# Patient Record
Sex: FEMALE | Race: WHITE | NOT HISPANIC OR LATINO | Employment: FULL TIME | URBAN - METROPOLITAN AREA
[De-identification: names, ages, dates, MRNs, and addresses within clinical notes are randomized per-mention and may not be internally consistent; named-entity substitution may affect disease eponyms.]

---

## 2017-09-29 ENCOUNTER — ALLSCRIPTS OFFICE VISIT (OUTPATIENT)
Dept: OTHER | Facility: OTHER | Age: 57
End: 2017-09-29

## 2017-09-29 DIAGNOSIS — Z12.31 ENCOUNTER FOR SCREENING MAMMOGRAM FOR MALIGNANT NEOPLASM OF BREAST: ICD-10-CM

## 2017-09-30 ENCOUNTER — LAB CONVERSION - ENCOUNTER (OUTPATIENT)
Dept: OTHER | Facility: OTHER | Age: 57
End: 2017-09-30

## 2017-09-30 LAB
A/G RATIO (HISTORICAL): 1.3 (CALC) (ref 1–2.5)
ALBUMIN SERPL BCP-MCNC: 4.2 G/DL (ref 3.6–5.1)
ALP SERPL-CCNC: 106 U/L (ref 33–130)
ALT SERPL W P-5'-P-CCNC: 13 U/L (ref 6–29)
AST SERPL W P-5'-P-CCNC: 15 U/L (ref 10–35)
BASOPHILS # BLD AUTO: 1.2 %
BASOPHILS # BLD AUTO: 73 CELLS/UL (ref 0–200)
BILIRUB SERPL-MCNC: 0.5 MG/DL (ref 0.2–1.2)
BUN SERPL-MCNC: 15 MG/DL (ref 7–25)
BUN/CREA RATIO (HISTORICAL): NORMAL (CALC) (ref 6–22)
CALCIUM SERPL-MCNC: 9.5 MG/DL (ref 8.6–10.4)
CHLORIDE SERPL-SCNC: 104 MMOL/L (ref 98–110)
CHOLEST SERPL-MCNC: 191 MG/DL
CHOLEST/HDLC SERPL: 3.3 (CALC)
CO2 SERPL-SCNC: 29 MMOL/L (ref 20–31)
CREAT SERPL-MCNC: 0.81 MG/DL (ref 0.5–1.05)
DEPRECATED RDW RBC AUTO: 14.6 % (ref 11–15)
EGFR AFRICAN AMERICAN (HISTORICAL): 93 ML/MIN/1.73M2
EGFR-AMERICAN CALC (HISTORICAL): 81 ML/MIN/1.73M2
EOSINOPHIL # BLD AUTO: 122 CELLS/UL (ref 15–500)
EOSINOPHIL # BLD AUTO: 2 %
GAMMA GLOBULIN (HISTORICAL): 3.2 G/DL (CALC) (ref 1.9–3.7)
GLUCOSE (HISTORICAL): 91 MG/DL (ref 65–99)
HCT VFR BLD AUTO: 34.5 % (ref 35–45)
HDLC SERPL-MCNC: 58 MG/DL
HGB BLD-MCNC: 11.1 G/DL (ref 11.7–15.5)
LDL CHOLESTEROL (HISTORICAL): 105 MG/DL (CALC)
LYMPHOCYTES # BLD AUTO: 2105 CELLS/UL (ref 850–3900)
LYMPHOCYTES # BLD AUTO: 34.5 %
MCH RBC QN AUTO: 27.3 PG (ref 27–33)
MCHC RBC AUTO-ENTMCNC: 32.2 G/DL (ref 32–36)
MCV RBC AUTO: 84.8 FL (ref 80–100)
MONOCYTES # BLD AUTO: 555 CELLS/UL (ref 200–950)
MONOCYTES (HISTORICAL): 9.1 %
NEUTROPHILS # BLD AUTO: 3245 CELLS/UL (ref 1500–7800)
NEUTROPHILS # BLD AUTO: 53.2 %
NON-HDL-CHOL (CHOL-HDL) (HISTORICAL): 133 MG/DL (CALC)
PLATELET # BLD AUTO: 317 THOUSAND/UL (ref 140–400)
PMV BLD AUTO: 11.3 FL (ref 7.5–12.5)
POTASSIUM SERPL-SCNC: 4.7 MMOL/L (ref 3.5–5.3)
RBC # BLD AUTO: 4.07 MILLION/UL (ref 3.8–5.1)
SODIUM SERPL-SCNC: 140 MMOL/L (ref 135–146)
TOTAL PROTEIN (HISTORICAL): 7.4 G/DL (ref 6.1–8.1)
TRIGL SERPL-MCNC: 157 MG/DL
TSH SERPL DL<=0.05 MIU/L-ACNC: 4.16 MIU/L (ref 0.4–4.5)
WBC # BLD AUTO: 6.1 THOUSAND/UL (ref 3.8–10.8)

## 2017-10-02 ENCOUNTER — GENERIC CONVERSION - ENCOUNTER (OUTPATIENT)
Dept: OTHER | Facility: OTHER | Age: 57
End: 2017-10-02

## 2017-10-11 ENCOUNTER — GENERIC CONVERSION - ENCOUNTER (OUTPATIENT)
Dept: OTHER | Facility: OTHER | Age: 57
End: 2017-10-11

## 2017-10-20 ENCOUNTER — HOSPITAL ENCOUNTER (OUTPATIENT)
Dept: RADIOLOGY | Facility: HOSPITAL | Age: 57
Discharge: HOME | End: 2017-10-20
Payer: COMMERCIAL

## 2017-10-20 DIAGNOSIS — Z12.31 ENCOUNTER FOR SCREENING MAMMOGRAM FOR MALIGNANT NEOPLASM OF BREAST: ICD-10-CM

## 2017-10-20 PROCEDURE — G0202 SCR MAMMO BI INCL CAD: HCPCS

## 2017-11-28 ENCOUNTER — GENERIC CONVERSION - ENCOUNTER (OUTPATIENT)
Dept: OTHER | Facility: OTHER | Age: 57
End: 2017-11-28

## 2018-01-10 NOTE — RESULT NOTES
Discussion/Summary   Zari Liz,   Your mammogram is normal    Dr Lynn Breeding [id number] 03:07PM Jess Link Order Number: [id number]    - Patient Instructions: To schedule this appointment, please contact Central Scheduling at [phone]  Do not wear any perfume, powder, lotion or deodorant on breast or underarm area  Please bring your doctors order, referral (if needed) and insurance information with you on the day of the test  Failure to bring this information may result in this test being rescheduled  Arrive 15 minutes prior to your appointment time to register  On the day of your test, please bring any prior mammogram or breast studies with you that were not performed at a [facility]  Failure to bring prior exams may result in your test needing to be rescheduled  Test Name Result Flag Reference   MAMMO SCREENING BILATERAL W CAD (Report)     Patient History:   Patient is postmenopausal    Family history of unknown cancer at age 76 in paternal    grandmother, ovarian cancer at age 70 in paternal aunt, unknown    cancer in paternal uncle, ovaries removed from paternal aunt  Took hormonal contraceptives beginning at age 24  Patient has never smoked  Patient's BMI is 27 4  Reason for exam: screening, asymptomatic  Mammo Screening Bilateral W CAD: October 20, 2017 - Check In #:    [de-identified]   Bilateral CC and MLO view(s) were taken  Technologist: Elnita Gosselin, RTRM   Prior study comparison: July 16, 2016, mammo screening bilateral    W CAD performed at [facility]  May 13,    2015, bilateral screening mammogram performed at [facility]  January 31, 2014, bilateral screening    mammogram performed at [facility]  March 27, 2012, bilateral screening mammogram performed at [facility]   December 27, 2010, bilateral screening mammogram performed at [facility]  There are scattered fibroglandular densities  No dominant soft tissue mass, architectural distortion or    suspicious calcifications are noted in either breast  The skin    and nipple contours are within normal limits  No evidence of malignancy  No significant changes when compared with prior studies  ACR BI-RADSï¾® Assessments: BiRad:1 - Negative     Recommendation:   Routine screening mammogram of both breasts in 1 year  Analyzed by CAD     The patient is scheduled in a reminder system for screening    mammography  8-10% of cancers will be missed on mammography  Management of a    palpable abnormality must be based on clinical grounds  Patients   will be notified of their results via letter from our facility  Accredited by [employer] and FDA  Transcription Location: [facility] 98: [id number]     Risk Value(s):   Tyrer-Cuzick 10 Year: 4 000%, Tyrer-Cuzick Lifetime: 11 500%,    Myriad Table: 3 0%, SANCHEZ 5 Year: 1 4%, NCI Lifetime: 8 7%   Signed by:   Mat Chi MD   10/20/17       Plan  Encounter for screening mammogram for malignant neoplasm of breast    · * MAMMO SCREENING BILATERAL W CAD ; every 2 years;  Last [id number]; Next  06RYL3162; Status:Active

## 2018-01-11 NOTE — RESULT NOTES
Discussion/Summary   [facility],   Here is a copy of the labs we discussed  Dr Vani Reza      Verified Results  (1) COMPREHENSIVE METABOLIC PANEL 57SCS1980 28:58JD Alma Castano     Test Name Result Flag Reference   GLUCOSE 91 mg/dL  65-99   Fasting reference interval   UREA NITROGEN (BUN) 15 mg/dL  7-25   CREATININE 0 81 mg/dL  0 50-1 05   For patients >52years of age, the reference limit  for Creatinine is approximately 13% higher for people  identified as -American  eGFR NON-AFR   AMERICAN 81 mL/min/1 73m2  > OR = 60   eGFR AFRICAN AMERICAN 93 mL/min/1 73m2  > OR = 60   BUN/CREATININE RATIO   1-58   NOT APPLICABLE (calc)   SODIUM 140 mmol/L  135-146   POTASSIUM 4 7 mmol/L  3 5-5 3   CHLORIDE 104 mmol/L     CARBON DIOXIDE 29 mmol/L  20-31   CALCIUM 9 5 mg/dL  8 6-10 4   PROTEIN, TOTAL 7 4 g/dL  6 1-8 1   ALBUMIN 4 2 g/dL  3 6-5 1   GLOBULIN 3 2 g/dL (calc)  1 9-3 7   ALBUMIN/GLOBULIN RATIO 1 3 (calc)  1 0-2 5   BILIRUBIN, TOTAL 0 5 mg/dL  0 2-1 2   ALKALINE PHOSPHATASE 106 U/L     AST 15 U/L  10-35   ALT 13 U/L  6-29     (1) CBC/PLT/DIFF 19Jel8043 10:09AM Alma Castano     Test Name Result Flag Reference   WHITE BLOOD CELL COUNT 6 1 Thousand/uL  3 8-10 8   RED BLOOD CELL COUNT 4 07 Million/uL  3 80-5 10   HEMOGLOBIN 11 1 g/dL L 11 7-15 5   HEMATOCRIT 34 5 % L 35 0-45 0   MCV 84 8 fL  80 0-100 0   MCH 27 3 pg  27 0-33 0   MCHC 32 2 g/dL  32 0-36 0   RDW 14 6 %  11 0-15 0   PLATELET COUNT 572 Thousand/uL  140-400   ABSOLUTE NEUTROPHILS 3245 cells/uL  4134-0242   ABSOLUTE LYMPHOCYTES 2105 cells/uL  850-3900   ABSOLUTE MONOCYTES 555 cells/uL  200-950   ABSOLUTE EOSINOPHILS 122 cells/uL     ABSOLUTE BASOPHILS 73 cells/uL  0-200   NEUTROPHILS 53 2 %     LYMPHOCYTES 34 5 %     MONOCYTES 9 1 %     EOSINOPHILS 2 0 %     BASOPHILS 1 2 %     MPV 11 3 fL  7 5-12 5     (Q) LIPID PANEL WITH REFLEX TO DIRECT LDL 81HKL2242 10:09AM Alma Castano     Test Name Result Flag Reference   CHOLESTEROL, TOTAL 191 mg/dL  <200   HDL CHOLESTEROL 58 mg/dL  >68   TRIGLICERIDES 373 mg/dL H <150   LDL-CHOLESTEROL 105 mg/dL (calc) H    Reference range: <100     Desirable range <100 mg/dL for patients with CHD or  diabetes and <70 mg/dL for diabetic patients with  known heart disease  LDL-C is now calculated using the Bijan-Beauchamp   calculation, which is a validated novel method providing   better accuracy than the Friedewald equation in the   estimation of LDL-C  Vashti Martinez  [facility]  3368;859(88): 6710-1476   (http://FClub/faq/PYQ417)   CHOL/HDLC RATIO 3 3 (calc)  <5 0   NON HDL CHOLESTEROL 133 mg/dL (calc) H <130   For patients with diabetes plus 1 major ASCVD risk   factor, treating to a non-HDL-C goal of <100 mg/dL   (LDL-C of <70 mg/dL) is considered a therapeutic   option       (Q) TSH, 3RD GENERATION 57Zgw1778 10:09AM Filippo Mast   REPORT COMMENT:  FASTING:YES     Test Name Result Flag Reference   TSH 4 16 mIU/L  0 40-4 50

## 2018-01-12 NOTE — RESULT NOTES
Verified Results  * XR SPINE LUMBAR MINIMUM 4 VIEWS 21Apr2016 11:42AM Mariella Jack Order Number: [id number]     Test Name Result Flag Reference   XR SPINE LUMBAR MINIMUM 4 VIEWS (Report)     LUMBAR SPINE     INDICATION: Lower back pain with spasms  COMPARISON: None     VIEWS: AP, lateral, bilateral oblique and coned down projections; 4 images     FINDINGS:     L4 anterolisthesis  There is no radiographic evidence of acute fracture or destructive osseous lesion  Degenerative disc disease at multiple levels most severe at L4-L5 and L5-S1 with associated marginal osteophytosis and facet joint hypertrophy  Visualized soft tissues appear unremarkable  IMPRESSION:     Grade 1 L4 anterolisthesis  Degenerative disc disease at multiple levels most severe at L4-L5 and L5-S1 with associated marginal osteophytosis and facet joint hypertrophy  Workstation performed: [id number]     Signed by:    Luisa Murcia MD   4/21/16       Discussion/Summary   Your xray shows arthritis  - Dr Colton Perez

## 2018-01-16 NOTE — PROGRESS NOTES
Assessment    1  Encounter for preventive health examination (V70 0) (Z00 00)   2  Encounter for screening mammogram for malignant neoplasm of breast (V76 12)   (Z12 31)   3  Screening for cardiovascular condition (V81 2) (Z13 6)   4  Never a smoker   5  Overweight (278 02) (E66 3)    Plan  Benign essential hypertension    · (1) CBC/PLT/DIFF; Status:Active; Requested [id number];    · (1) TSH; Status:Active; Requested [id number];   Encounter for screening mammogram for malignant neoplasm of breast    · * MAMMO SCREENING BILATERAL W CAD; Status:Active; Requested UVD:93PJX1235; Overweight    · Begin a limited exercise program ; Status:Complete;   Done: 91GIP1325   · We recommend that you bring your body mass index down to 26 ; Status:Complete;    Done: 56SSK2445  Screening for cardiovascular condition    · (1) COMPREHENSIVE METABOLIC PANEL; Status:Active; Requested for:10Zgu3678;    · (1) LIPID PANEL FASTING W DIRECT LDL REFLEX; Status:Active; Requested  [id number];     Discussion/Summary  health maintenance visit Currently, she eats a healthy diet and has an adequate exercise regimen  cervical cancer screening is current normal pap smear and HPV negative  Breast cancer screening: mammogram has been ordered  Colorectal cancer screening: colorectal cancer screening is current and the next colonoscopy is due   Chief Complaint  CPE rmklpn      History of Present Illness  HM, Adult Female: The patient is being seen for a health maintenance evaluation  General Health: The patient's health since the last visit is described as good  Lifestyle:  She exercises regularly  She does not use tobacco    Reproductive health: the patient is postmenopausal    Screening:      Review of Systems    Constitutional: No fever, no chills, feels well, no tiredness, no recent weight gain or weight loss     Respiratory: No complaints of shortness of breath, no wheezing, no cough, no SOB on exertion, no orthopnea, no PND    Musculoskeletal: knee pain at night  Active Problems    1  Allergic reaction (995 3) (T78 40XA)   2  Allergic rhinitis (477 9) (J30 9)   3  Back pain (724 5) (M54 9)   4  Baker's cyst, ruptured (727 51) (M66 0)   5  Benign essential hypertension (401 1) (I10)   6  BMI 28 0-28 9,adult (V85 24) (Z68 28)   7  Encounter for routine pelvic examination (V72 31) (Z01 419)   8  Encounter for screening for malignant neoplasm of colon (V76 51) (Z12 11)   9  Encounter for screening mammogram for malignant neoplasm of breast (V76 12)   (Z12 31)   10  GERD without esophagitis (530 81) (K21 9)   11  Long term use of drug (V58 69) (Z79 899)   12  Never a smoker   13  Screening for cardiovascular condition (V81 2) (Z13 6)    Past Medical History    · History of Abdominal pain (789 00) (R10 9)   · Acute maxillary sinusitis (461 0) (J01 00)   · History of Acute upper respiratory infection (465 9) (J06 9)   · History of Acute upper respiratory infection (465 9) (J06 9)   · Baker's cyst, ruptured (727 51) (M66 0)   · History of Closed Fracture Of The Metatarsal Bone(S) (825 25)   · Encounter for routine pelvic examination (V72 31) (Z01 419)   · History of Exposure to rabies (V01 5) (Z20 3)   · History of acute sinusitis (V12 69) (Z87 09)   · History of precordial chest pain (V13 89) (D80 190)   · History of Impacted cerumen of left ear (380 4) (H61 22)   · History of Splinter Of Fingers (915 6)    Family History  Father    · Family history of Glaucoma   · Family history of Hypertension  Paternal Aunt    · Family history of Colon cancer  Family History    · Family history of hypertension (V17 49) (Z82 49)    Social History    · Never a smoker    Current Meds   1  EpiPen 2-Thor 0 3 MG/0 3ML Injection Solution Auto-injector; use as directed for   cephalexin allergy, proceed to ER after use; Therapy: 97FLF4065 to (Last Rx:98Mog3109)  Requested for: 86Bew5796 Ordered   2   Glucosamine-Chondroitin Oral Capsule; 1 every day; Therapy: (Recorded:10Nov2014) to Recorded   3  Losartan Potassium 25 MG Oral Tablet; TAKE 1 TABLET DAILY AS     DIRECTED; Therapy: 40CXQ8621 to (Evaluate:11Oct2017)  Requested for: 58APN3499; Last   Rx:26Bvl7001 Ordered   4  Lysine HCl - 500 MG Oral Tablet; TAKE 1 TABLET DAILY; Therapy: 85BXJ2379 to Recorded   5  NexIUM 24HR 20 MG Oral Capsule Delayed Release; DAILY AS NEEDED; Therapy: 85ZID4808 to (Evaluate:12Jun2015); Last Rx:01Gpv3735 Ordered   6  Vitamin D3 1000 UNIT Oral Tablet; 1 po daily; Therapy: 67RHO5230 to Recorded    Allergies    1  Cephalexin TABS    Vitals   Recorded: 05WBW1145 09:22AM   Temperature 97 2 F   Heart Rate 74   Respiration 18   Systolic 933   Diastolic 80   Height 5 ft 6 in   Weight 180 lb    BMI Calculated 29 05   BSA Calculated 1 91     Physical Exam    Constitutional   General appearance: No acute distress, well appearing and well nourished  Ears, Nose, Mouth, and Throat   External inspection of ears and nose: Normal     Otoscopic examination: Tympanic membranes translucent with normal light reflex  Canals patent without erythema  Oropharynx: Normal with no erythema, edema, exudate or lesions  Pulmonary   Auscultation of lungs: Clear to auscultation  Cardiovascular   Auscultation of heart: Normal rate and rhythm, normal S1 and S2, no murmurs  Abdomen   Abdomen: Non-tender, no masses  Musculoskeletal   Gait and station: Normal        Results/Data  PHQ-2 Adult Depression Screening 05Syu2160 09:22AM User, s     Test Name Result Flag Reference   PHQ-2 Adult Depression Score 0     Over the last two weeks, how often have you been bothered by any of the following problems? Little interest or pleasure in doing things: Not at all - 0  Feeling down, depressed, or hopeless: Not at all - 0   PHQ-2 Adult Depression Screening Negative         Procedure    Procedure: Visual Acuity Test    Indication: routine screening  Inforrmation supplied by a Snellen chart     Results: 20/25 in both eyes without corrective device, 20/25 in the right eye without corrective device, 20/25 in the left eye without corrective device      Health Management  Encounter for screening mammogram for malignant neoplasm of breast   * MAMMO SCREENING BILATERAL W CAD; every 2 years; Last 80WQQ1037; Next Due:  45UCE3025;  Active    Signatures   Electronically signed by : Jeferson Chacon DO; Sep 29 2017 11:17AM EST                       (Author)

## 2018-01-16 NOTE — RESULT NOTES
Verified Results  * MAMMO SCREENING BILATERAL W CAD 63VWS0064 10:03AM Sharon Jolley Order Number: [id number]     Test Name Result Flag Reference   MAMMO SCREENING BILATERAL W CAD (Report)     Patient History:   Patient is postmenopausal    Family history of unknown cancer in paternal uncle, ovarian    cancer in paternal aunt at age 70, unknown cancer in paternal    grandmother at age 76, and ovaries removed from paternal aunt  Took hormonal contraceptives beginning at age 24  Patient has never smoked  Patient's BMI is 27 4  Reason for exam: screening (asymptomatic)  Screening     Mammo Screening Bilateral W CAD: July 16, 2016 - Check In #:    [de-identified]   Bilateral CC and MLO view(s) were taken  Technologist: RT Isiah(R)(M)   Prior study comparison: May 13, 2015, bilateral screening    mammogram performed at [place] [address]  January 31, 2014, bilateral screening mammogram performed at [place] [address]  March 27, 2012, bilateral    screening mammogram performed at [place] [address]  December 27, 2010, bilateral screening mammogram    performed at [place] [address]  There are scattered fibroglandular densities  No dominate soft    tissue mass, architectural distortion or suspicious    calcifications are noted  The skin and nipple contours are    within normal limits  No evidence of malignancy  No significant   change when compared to the prior studies  1  No evidence of malignancy  2  No significant change when compared with the prior study  ASSESSMENT: BiRad:1 - Negative     Recommendation:   Routine screening mammogram of both breasts in 1 year  A reminder letter will be scheduled   Analyzed by CAD     8-10% of cancers will be missed on mammography  Management of a    palpable abnormality must be based on clinical grounds   Patients   will be notified of their results via letter from our facility  Accredited by [employer] and FDA  Transcription Location: [facility] 98: [id number]     Risk Value(s):   Tyrer-Cuzick 10 Year: 3 757%, Tyrer-Cuzick Lifetime: 12 264%,    Myriad Table: 3 0%, SANCHEZ 5 Year: 1 3%, NCI Lifetime: 9 1%   Signed by:   Donavan Shaikh MD   7/18/16       Plan  Benign essential hypertension    · Follow-up visit in 6 months Evaluation and Treatment  Follow-up  Status: Complete -  Scheduling  Done: 39EZN9402 07:17PM  Benign essential hypertension, Screening for cardiovascular condition, Screening for  deficiency anemia, Screening for diabetes mellitus    · (1) CBC/PLT/DIFF; Status:Active; Requested for:51Izh1743;    · (1) COMPREHENSIVE METABOLIC PANEL; Status:Active; Requested for:84Jvo9924;    · (1) LIPID PANEL FASTING W DIRECT LDL REFLEX; Status:Active; Requested  for:08Jnv0572;    · (1) TSH; Status:Active; Requested for:03Ljg9300;   Long term use of drug    · (1) MAGNESIUM; Status:Active; Requested for:80Zak4157;    · (1) VITAMIN B12; Status:Active;  Requested for:48Hig8253;   PMH: Bug bite    · Triamcinolone Acetonide 0 1 % External Cream    Discussion/Summary   Rufina,   Your mammogram is normal    Dr Ulisses Guillory

## 2018-01-17 NOTE — RESULT NOTES
Verified Results  (1) COMPREHENSIVE METABOLIC PANEL 61LFG8999 82:87GD Jaimie [place]     Test Name Result Flag Reference   GLUCOSE 92 mg/dL  65-99   Fasting reference interval   UREA NITROGEN (BUN) 18 mg/dL  7-25   CREATININE 0 79 mg/dL  0 50-1 05   For patients >52years of age, the reference limit  for Creatinine is approximately 13% higher for people  identified as -American  eGFR NON-AFR   AMERICAN 84 mL/min/1 73m2  > OR = 60   eGFR AFRICAN AMERICAN 98 mL/min/1 73m2  > OR = 60   BUN/CREATININE RATIO   4-01   NOT APPLICABLE (calc)   SODIUM 138 mmol/L  135-146   POTASSIUM 4 3 mmol/L  3 5-5 3   CHLORIDE 102 mmol/L     CARBON DIOXIDE 26 mmol/L  19-30   CALCIUM 9 2 mg/dL  8 6-10 4   PROTEIN, TOTAL 7 1 g/dL  6 1-8 1   ALBUMIN 4 0 g/dL  3 6-5 1   GLOBULIN 3 1 g/dL (calc)  1 9-3 7   ALBUMIN/GLOBULIN RATIO 1 3 (calc)  1 0-2 5   BILIRUBIN, TOTAL 0 4 mg/dL  0 2-1 2   ALKALINE PHOSPHATASE 106 U/L     AST 14 U/L  10-35   ALT 12 U/L  6-29     (1) CBC/PLT/DIFF 80LEV0557 10:10AM Jaimie [place]     Test Name Result Flag Reference   WHITE BLOOD CELL COUNT 5 6 Thousand/uL  3 8-10 8   RED BLOOD CELL COUNT 4 06 Million/uL  3 80-5 10   HEMOGLOBIN 12 3 g/dL  11 7-15 5   HEMATOCRIT 37 2 %  35 0-45 0   MCV 91 7 fL  80 0-100 0   MCH 30 3 pg  27 0-33 0   MCHC 33 1 g/dL  32 0-36 0   RDW 13 7 %  11 0-15 0   PLATELET COUNT 227 Thousand/uL  140-400   MPV 9 6 fL  7 5-11 5   ABSOLUTE NEUTROPHILS 2694 cells/uL  0022-9488   ABSOLUTE LYMPHOCYTES 2201 cells/uL  850-3900   ABSOLUTE MONOCYTES 510 cells/uL  200-950   ABSOLUTE EOSINOPHILS 146 cells/uL     ABSOLUTE BASOPHILS 50 cells/uL  0-200   NEUTROPHILS 48 1 %     LYMPHOCYTES 39 3 %     MONOCYTES 9 1 %     EOSINOPHILS 2 6 %     BASOPHILS 0 9 %       (1) MAGNESIUM 06Ebg1750 10:10AM Jaimie [place]     Test Name Result Flag Reference   MAGNESIUM 1 9 mg/dL  1 5-2 5     (1) VITAMIN B12 07OBN8590 10:10AM Jaimie Mcintyre     Test Name Result Flag Reference   VITAMIN B12 512 pg/mL 200-1100     (Q) LIPID PANEL WITH REFLEX TO DIRECT LDL 98VJD5623 10:10AM [employer]     Test Name Result Flag Reference   CHOLESTEROL, TOTAL 209 mg/dL H 125-200   HDL CHOLESTEROL 59 mg/dL  > OR = 46   TRIGLICERIDES 899 mg/dL H <150   LDL-CHOLESTEROL 116 mg/dL (calc)  <130   Desirable range <100 mg/dL for patients with CHD or  diabetes and <70 mg/dL for diabetic patients with  known heart disease  CHOL/HDLC RATIO 3 5 (calc)  < OR = 5 0   NON HDL CHOLESTEROL 150 mg/dL (calc)     Target for non-HDL cholesterol is 30 mg/dL higher than   LDL cholesterol target  (Q) TSH, 3RD GENERATION 20IVR8596 10:10AM [employer]     Test Name Result Flag Reference   TSH 3 11 mIU/L     Reference Range                         > or = 20 Years  0 40-4 50                              Pregnancy Ranges            First trimester    0 26-2 66            Second trimester   0 55-2 73            Third trimester    0 43-2 91       Discussion/Summary   Gonzales Miller,   Your triglycerides have increased  Please watch your sugar/carbohydrate intake     Otherwise, kidney function, liver function, blood count, Thyroid, Vitamin B12, Magnesium and blood sugar are normal    Dr Hayder Lechuga

## 2018-01-22 VITALS
WEIGHT: 180 LBS | RESPIRATION RATE: 18 BRPM | BODY MASS INDEX: 28.93 KG/M2 | DIASTOLIC BLOOD PRESSURE: 80 MMHG | HEIGHT: 66 IN | TEMPERATURE: 97.2 F | HEART RATE: 74 BPM | SYSTOLIC BLOOD PRESSURE: 120 MMHG

## 2018-02-28 ENCOUNTER — TELEPHONE (OUTPATIENT)
Dept: FAMILY MEDICINE CLINIC | Facility: CLINIC | Age: 58
End: 2018-02-28

## 2018-02-28 DIAGNOSIS — Z77.21 EXPOSURE TO BLOOD OR BODY FLUID: Primary | ICD-10-CM

## 2018-03-01 NOTE — TELEPHONE ENCOUNTER
Pt  States  She will be going to [facility]  3/18/2018     Was told   Its recommended to get a  Heptatis A vaccine  Pt  Would also like to know  If she is immune to measles, mumps and rubella  af/rma     Please advise if pt could receive  Vaccine   Af/rma

## 2018-03-02 NOTE — TELEPHONE ENCOUNTER
3/2/2018 7:27 AM She should schedule a Hepatitis A vaccine  I don't know if she is immune to measles, mumps and rubella  If she would like blood work to find out I can order it for her    Caryn Hansen, DO

## 2018-03-31 DIAGNOSIS — I10 BENIGN ESSENTIAL HYPERTENSION: Primary | ICD-10-CM

## 2018-04-01 PROBLEM — E66.3 OVERWEIGHT: Status: ACTIVE | Noted: 2017-09-29

## 2018-04-01 RX ORDER — LOSARTAN POTASSIUM 25 MG/1
TABLET ORAL
Qty: 90 TABLET | Refills: 1 | Status: SHIPPED | OUTPATIENT
Start: 2018-04-01 | End: 2018-09-06 | Stop reason: SDUPTHER

## 2018-09-06 DIAGNOSIS — I10 BENIGN ESSENTIAL HYPERTENSION: ICD-10-CM

## 2018-09-06 NOTE — TELEPHONE ENCOUNTER
Please call the patient  She has not been seen in almost a year  I would like her to set up an appointment and she needs lab work as well  When she schedules  her appointment I will refill her losartan    Toñito Frances, DO

## 2018-09-07 RX ORDER — LOSARTAN POTASSIUM 25 MG/1
25 TABLET ORAL DAILY
Qty: 90 TABLET | Refills: 0 | Status: SHIPPED | OUTPATIENT
Start: 2018-09-07 | End: 2018-09-10 | Stop reason: SDUPTHER

## 2018-09-07 NOTE — TELEPHONE ENCOUNTER
9/7/2018 8:05 AM I refilled her medication  I also wrote up a slip for her lab work and mailed to her  No further action required at this time    Dr Cori Silva

## 2018-09-10 DIAGNOSIS — I10 BENIGN ESSENTIAL HYPERTENSION: ICD-10-CM

## 2018-09-10 RX ORDER — LOSARTAN POTASSIUM 25 MG/1
25 TABLET ORAL DAILY
Qty: 30 TABLET | Refills: 0 | Status: SHIPPED | OUTPATIENT
Start: 2018-09-10 | End: 2018-10-08 | Stop reason: SDUPTHER

## 2018-09-10 NOTE — TELEPHONE ENCOUNTER
Please refill losartan to [facility]  An order went to mail order but can she have a short term to [facility]

## 2018-10-06 RX ORDER — EPINEPHRINE 0.3 MG/.3ML
INJECTION SUBCUTANEOUS
COMMUNITY
Start: 2015-07-24

## 2018-10-08 ENCOUNTER — OFFICE VISIT (OUTPATIENT)
Dept: FAMILY MEDICINE CLINIC | Facility: CLINIC | Age: 58
End: 2018-10-08
Payer: COMMERCIAL

## 2018-10-08 VITALS
WEIGHT: 171 LBS | RESPIRATION RATE: 16 BRPM | DIASTOLIC BLOOD PRESSURE: 80 MMHG | TEMPERATURE: 96.9 F | SYSTOLIC BLOOD PRESSURE: 116 MMHG | BODY MASS INDEX: 27.48 KG/M2 | HEART RATE: 74 BPM | HEIGHT: 66 IN

## 2018-10-08 DIAGNOSIS — Z00.00 ANNUAL PHYSICAL EXAM: Primary | ICD-10-CM

## 2018-10-08 DIAGNOSIS — Z12.31 SCREENING MAMMOGRAM, ENCOUNTER FOR: ICD-10-CM

## 2018-10-08 DIAGNOSIS — Z23 NEED FOR VACCINATION: ICD-10-CM

## 2018-10-08 DIAGNOSIS — I10 BENIGN ESSENTIAL HYPERTENSION: ICD-10-CM

## 2018-10-08 DIAGNOSIS — Z12.11 COLON CANCER SCREENING: ICD-10-CM

## 2018-10-08 DIAGNOSIS — Z01.419 ENCOUNTER FOR GYNECOLOGICAL EXAMINATION WITHOUT ABNORMAL FINDING: ICD-10-CM

## 2018-10-08 DIAGNOSIS — D17.1 LIPOMA OF BACK: ICD-10-CM

## 2018-10-08 LAB — SL AMB POCT FECES OCC BLD: NEGATIVE

## 2018-10-08 PROCEDURE — 90471 IMMUNIZATION ADMIN: CPT

## 2018-10-08 PROCEDURE — 90715 TDAP VACCINE 7 YRS/> IM: CPT

## 2018-10-08 PROCEDURE — 99396 PREV VISIT EST AGE 40-64: CPT | Performed by: FAMILY MEDICINE

## 2018-10-08 PROCEDURE — 82270 OCCULT BLOOD FECES: CPT | Performed by: FAMILY MEDICINE

## 2018-10-08 RX ORDER — LOSARTAN POTASSIUM 25 MG/1
25 TABLET ORAL DAILY
Qty: 90 TABLET | Refills: 1 | Status: SHIPPED | OUTPATIENT
Start: 2018-10-08 | End: 2019-06-14 | Stop reason: SDUPTHER

## 2018-10-08 NOTE — PROGRESS NOTES
FAMILY PRACTICE HEALTH MAINTENANCE OFFICE VISIT  [facility]    NAME: Pedro Rand  AGE: 62 y o  SEX: female  : 1960     DATE: 10/8/2018    Assessment and Plan     Problem List Items Addressed This Visit     Benign essential hypertension    Relevant Medications    EPINEPHrine (EPIPEN) 0 3 mg/0 3 mL SOAJ    losartan (COZAAR) 25 mg tablet    Other Relevant Orders    CBC    Comprehensive metabolic panel    Lipid Panel with Direct LDL reflex    Lipoma of back    Relevant Orders    Ambulatory referral to General Surgery      Other Visit Diagnoses     Annual physical exam    -  Primary    Encounter for gynecological examination without abnormal finding        Relevant Orders    Thinprep Pap and HR HPV DNA    BMI 28 0-28 9,adult        has started weight watchers, exercise encouraged  Screening mammogram, encounter for        Relevant Orders    Mammo screening bilateral w cad    Need for vaccination        Relevant Orders    TDAP VACCINE GREATER THAN OR EQUAL TO 6YO IM (Completed)    Colon cancer screening        Relevant Orders    POCT hemoccult screening (Completed)            · Patient Counseling:   · Nutrition: Stressed importance of a well balanced diet, moderation of sodium/saturated fat, caloric balance and sufficient intake of fiber  · Exercise: Stressed the importance of regular exercise with a goal of 150 minutes per week  · Dental Health: Discussed daily flossing and brushing and regular dental visits     · Immunizations reviewed Shingrix vaccine highly vaccine highly recommended  She is planning on getting her flu shot done at work  · Discussed benefits of screening mammogram and pap smears  · Discussed the patient's BMI with her  The BMI is above average; BMI management plan is completed     Return in about 6 months (around 2019) for Next scheduled follow up          Chief Complaint     Chief Complaint   Patient presents with    Physical Exam With pap if due/ prcma       History of Present Illness     She has started Weight Watchers  Well Adult Physical   Patient here for a comprehensive physical exam       Diet and Physical Activity  Diet: well balanced diet  Weight concerns: Patient is overweight (BMI 25 0-29  9)  Exercise: infrequently      Depression Screen  PHQ-9 Depression Screening    PHQ-9:    Frequency of the following problems over the past two weeks:       Little interest or pleasure in doing things:  0 - not at all  Feeling down, depressed, or hopeless:  0 - not at all  PHQ-2 Score:  0          General Health  Hearing: Normal:  bilateral  Vision: wears glasses  Dental: regular dental visits    Reproductive Health  Post menopause      The following portions of the patient's history were reviewed and updated as appropriate: allergies, current medications, past family history, past medical history, past social history, past surgical history and problem list     Review of Systems     Review of Systems   Respiratory: Negative  Cardiovascular: Negative  Skin:        Lump left side of back       Past Medical History     Past Medical History:   Diagnosis Date    Baker's cyst, ruptured     Last assessed - 11/11/14    Closed fracture of metatarsal bone 01/13/2009    Exposure to rabies 06/19/2008    Precordial chest pain     Last assessed - 5/29/13       Past Surgical History     No past surgical history on file      Social History     Social History     Social History    Marital status:      Spouse name: N/A    Number of children: N/A    Years of education: N/A     Social History Main Topics    Smoking status: Never Smoker    Smokeless tobacco: Never Used    Alcohol use None    Drug use: Unknown    Sexual activity: Not Asked     Other Topics Concern    None     Social History Narrative    None       Family History     Family History   Problem Relation Age of Onset   Dayne Graves Glaucoma Father     Hypertension Father    Dayne Graves Colon cancer Paternal Aunt     Hypertension Family        Current Medications       Current Outpatient Prescriptions:     EPINEPHrine (EPIPEN) 0 3 mg/0 3 mL SOAJ, Inject as directed, Disp: , Rfl:     losartan (COZAAR) 25 mg tablet, Take 1 tablet (25 mg total) by mouth daily, Disp: 90 tablet, Rfl: 1     Allergies     Allergies   Allergen Reactions    Cephalexin        Objective     /80   Pulse 74   Temp (!) 96 9 °F (36 1 °C)   Resp 16   Ht 5' 5 5" (1 664 m)   Wt 77 6 kg (171 lb)   BMI 28 02 kg/m²      Physical Exam   Constitutional: She appears well-developed and well-nourished  HENT:   Head: Normocephalic and atraumatic  Right Ear: External ear normal    Left Ear: External ear normal    Mouth/Throat: Oropharynx is clear and moist    Neck: Normal range of motion  Cardiovascular: Normal rate, regular rhythm and normal heart sounds  No murmur heard  Pulmonary/Chest: Effort normal and breath sounds normal  No respiratory distress  She has no wheezes  She has no rales  Abdominal: Soft  Bowel sounds are normal  She exhibits no distension  There is no tenderness  There is no rebound  Genitourinary: Rectum normal and vagina normal  Rectal exam shows no tenderness and guaiac negative stool  No breast swelling, tenderness, discharge or bleeding  There is no rash, tenderness or lesion on the right labia  There is no rash, tenderness or lesion on the left labia  No tenderness in the vagina  No vaginal discharge found  Genitourinary Comments: Uterus - non-tender  Ovaries - no palpable masses   Skin:   Palpable lump left mid back   Nursing note and vitals reviewed           Visual Acuity Screening    Right eye Left eye Both eyes   Without correction: 20 /30 20/ 25 20 /25   With correction:          Health Maintenance     Health Maintenance   Topic Date Due    Depression Screening PHQ  1960    CRC Screening: Colonoscopy  1960    PAP SMEAR  01/15/2017    INFLUENZA VACCINE  09/01/2018  DTaP,Tdap,and Td Vaccines (2 - Td) 10/29/2018    MAMMOGRAM  10/20/2019     Immunization History   Administered Date(s) Administered    Influenza TIV (IM) 10/20/2010    Rabies Immune Globulin 06/26/2008, 07/03/2008, 07/17/2008    Tdap 10/29/2008, 10/08/2018       Ana Marques, [address] [facility]

## 2018-10-12 LAB
CLINICAL INFO: NORMAL
DATE PREVIOUS BX: NORMAL
HPV I/H RISK 1 DNA CVX QL PROBE+SIG AMP: NOT DETECTED
LMP START DATE: NORMAL
QUESTION/PROBLEM: NORMAL
SL AMB CONTAINER TYPE: NORMAL
SL AMB FINAL RESOLUTION: NORMAL
SL AMB PREV. PAP:: NORMAL
SL AMB REPORT STATUS: NORMAL
SPECIMEN SOURCE CVX/VAG CYTO: NORMAL
STAT OF ADQ CVX/VAG CYTO-IMP: NORMAL

## 2018-10-19 ENCOUNTER — TELEPHONE (OUTPATIENT)
Dept: FAMILY MEDICINE CLINIC | Facility: CLINIC | Age: 58
End: 2018-10-19

## 2018-10-19 DIAGNOSIS — Z79.899 ENCOUNTER FOR LONG-TERM CURRENT USE OF MEDICATION: Primary | ICD-10-CM

## 2018-10-19 NOTE — TELEPHONE ENCOUNTER
Pt called, states that because she takes an antacid all the time prescribed by her gi, she would like magnesium levels added on the blood work you ordered  She went to [facility] in [place]  Please advise if that can be added  Pt would like a call to let her know if it was added     bchurch lpn

## 2018-10-19 NOTE — TELEPHONE ENCOUNTER
Added magnesium level to the blood test on [employer]   No further action is required at this time Brian Kwok MA

## 2018-10-19 NOTE — TELEPHONE ENCOUNTER
I wrote a new order for a magnesium level  I put the order at the nurse desk    Please call [facility] and see if they can add the test and fax them the order      When this is complete please let her know  Sirena Simmons, DO

## 2018-10-23 LAB
ALBUMIN SERPL-MCNC: 4.3 G/DL (ref 3.6–5.1)
ALBUMIN/GLOB SERPL: 1.5 (CALC) (ref 1–2.5)
ALP SERPL-CCNC: 87 U/L (ref 33–130)
ALT SERPL-CCNC: 15 U/L (ref 6–29)
AST SERPL-CCNC: 15 U/L (ref 10–35)
BASOPHILS # BLD AUTO: 62 CELLS/UL (ref 0–200)
BASOPHILS NFR BLD AUTO: 1 %
BILIRUB SERPL-MCNC: 0.5 MG/DL (ref 0.2–1.2)
BUN SERPL-MCNC: 15 MG/DL (ref 7–25)
BUN/CREAT SERPL: NORMAL (CALC) (ref 6–22)
CALCIUM SERPL-MCNC: 9.6 MG/DL (ref 8.6–10.4)
CHLORIDE SERPL-SCNC: 106 MMOL/L (ref 98–110)
CHOLEST SERPL-MCNC: 211 MG/DL
CHOLEST/HDLC SERPL: 3.5 (CALC)
CO2 SERPL-SCNC: 28 MMOL/L (ref 20–32)
CREAT SERPL-MCNC: 0.89 MG/DL (ref 0.5–1.05)
EOSINOPHIL # BLD AUTO: 112 CELLS/UL (ref 15–500)
EOSINOPHIL NFR BLD AUTO: 1.8 %
ERYTHROCYTE [DISTWIDTH] IN BLOOD BY AUTOMATED COUNT: 13.1 % (ref 11–15)
GLOBULIN SER CALC-MCNC: 2.8 G/DL (CALC) (ref 1.9–3.7)
GLUCOSE SERPL-MCNC: 92 MG/DL (ref 65–99)
HCT VFR BLD AUTO: 40.6 % (ref 35–45)
HDLC SERPL-MCNC: 61 MG/DL
HGB BLD-MCNC: 13.3 G/DL (ref 11.7–15.5)
LDLC SERPL CALC-MCNC: 126 MG/DL (CALC)
LYMPHOCYTES # BLD AUTO: 2065 CELLS/UL (ref 850–3900)
LYMPHOCYTES NFR BLD AUTO: 33.3 %
MAGNESIUM SERPL-MCNC: 2.1 MG/DL (ref 1.5–2.5)
MCH RBC QN AUTO: 30.1 PG (ref 27–33)
MCHC RBC AUTO-ENTMCNC: 32.8 G/DL (ref 32–36)
MCV RBC AUTO: 91.9 FL (ref 80–100)
MONOCYTES # BLD AUTO: 471 CELLS/UL (ref 200–950)
MONOCYTES NFR BLD AUTO: 7.6 %
NEUTROPHILS # BLD AUTO: 3491 CELLS/UL (ref 1500–7800)
NEUTROPHILS NFR BLD AUTO: 56.3 %
NONHDLC SERPL-MCNC: 150 MG/DL (CALC)
PLATELET # BLD AUTO: 314 THOUSAND/UL (ref 140–400)
PMV BLD REES-ECKER: 10.9 FL (ref 7.5–12.5)
POTASSIUM SERPL-SCNC: 5.3 MMOL/L (ref 3.5–5.3)
PROT SERPL-MCNC: 7.1 G/DL (ref 6.1–8.1)
RBC # BLD AUTO: 4.42 MILLION/UL (ref 3.8–5.1)
REF LAB TEST NAME: NORMAL
REF LAB TEST: 622
SL AMB CLIENT CONTACT: NORMAL
SL AMB EGFR AFRICAN AMERICAN: 83 ML/MIN/1.73M2
SL AMB EGFR NON AFRICAN AMERICAN: 71 ML/MIN/1.73M2
SODIUM SERPL-SCNC: 142 MMOL/L (ref 135–146)
TRIGL SERPL-MCNC: 127 MG/DL
WBC # BLD AUTO: 6.2 THOUSAND/UL (ref 3.8–10.8)

## 2018-12-05 ENCOUNTER — HOSPITAL ENCOUNTER (OUTPATIENT)
Dept: RADIOLOGY | Facility: HOSPITAL | Age: 58
Discharge: HOME | End: 2018-12-05
Payer: COMMERCIAL

## 2018-12-05 VITALS — HEIGHT: 66 IN | WEIGHT: 160 LBS | BODY MASS INDEX: 25.71 KG/M2

## 2018-12-05 DIAGNOSIS — Z12.31 SCREENING MAMMOGRAM, ENCOUNTER FOR: ICD-10-CM

## 2018-12-05 PROCEDURE — 77067 SCR MAMMO BI INCL CAD: CPT

## 2018-12-05 PROCEDURE — 77063 BREAST TOMOSYNTHESIS BI: CPT

## 2019-04-19 ENCOUNTER — OFFICE VISIT (OUTPATIENT)
Dept: FAMILY MEDICINE CLINIC | Facility: CLINIC | Age: 59
End: 2019-04-19
Payer: COMMERCIAL

## 2019-04-19 VITALS
WEIGHT: 165 LBS | TEMPERATURE: 98.1 F | RESPIRATION RATE: 16 BRPM | HEIGHT: 66 IN | HEART RATE: 68 BPM | DIASTOLIC BLOOD PRESSURE: 80 MMHG | BODY MASS INDEX: 26.52 KG/M2 | SYSTOLIC BLOOD PRESSURE: 130 MMHG

## 2019-04-19 DIAGNOSIS — I10 BENIGN ESSENTIAL HYPERTENSION: Primary | ICD-10-CM

## 2019-04-19 PROBLEM — R92.2 DENSE BREAST: Status: ACTIVE | Noted: 2019-04-19

## 2019-04-19 PROBLEM — R92.30 DENSE BREAST: Status: ACTIVE | Noted: 2019-04-19

## 2019-04-19 PROCEDURE — 99213 OFFICE O/P EST LOW 20 MIN: CPT | Performed by: FAMILY MEDICINE

## 2019-04-19 PROCEDURE — 3075F SYST BP GE 130 - 139MM HG: CPT | Performed by: FAMILY MEDICINE

## 2019-04-19 PROCEDURE — 3079F DIAST BP 80-89 MM HG: CPT | Performed by: FAMILY MEDICINE

## 2019-04-27 LAB
ALBUMIN SERPL-MCNC: 4.3 G/DL (ref 3.5–5.5)
ALBUMIN/GLOB SERPL: 1.7 {RATIO} (ref 1.2–2.2)
ALP SERPL-CCNC: 82 IU/L (ref 39–117)
ALT SERPL-CCNC: 16 IU/L (ref 0–32)
AST SERPL-CCNC: 20 IU/L (ref 0–40)
BASOPHILS # BLD AUTO: 0 X10E3/UL (ref 0–0.2)
BASOPHILS NFR BLD AUTO: 1 %
BILIRUB SERPL-MCNC: 0.3 MG/DL (ref 0–1.2)
BUN SERPL-MCNC: 16 MG/DL (ref 6–24)
BUN/CREAT SERPL: 19 (ref 9–23)
CALCIUM SERPL-MCNC: 9.8 MG/DL (ref 8.7–10.2)
CHLORIDE SERPL-SCNC: 102 MMOL/L (ref 96–106)
CHOLEST SERPL-MCNC: 223 MG/DL (ref 100–199)
CO2 SERPL-SCNC: 25 MMOL/L (ref 20–29)
CREAT SERPL-MCNC: 0.83 MG/DL (ref 0.57–1)
EOSINOPHIL # BLD AUTO: 0.1 X10E3/UL (ref 0–0.4)
EOSINOPHIL NFR BLD AUTO: 2 %
ERYTHROCYTE [DISTWIDTH] IN BLOOD BY AUTOMATED COUNT: 14.3 % (ref 12.3–15.4)
GLOBULIN SER-MCNC: 2.6 G/DL (ref 1.5–4.5)
GLUCOSE SERPL-MCNC: 95 MG/DL (ref 65–99)
HCT VFR BLD AUTO: 38.5 % (ref 34–46.6)
HDLC SERPL-MCNC: 58 MG/DL
HGB BLD-MCNC: 12.7 G/DL (ref 11.1–15.9)
IMM GRANULOCYTES # BLD: 0 X10E3/UL (ref 0–0.1)
IMM GRANULOCYTES NFR BLD: 0 %
LABCORP COMMENT: NORMAL
LDLC SERPL CALC-MCNC: 134 MG/DL (ref 0–99)
LYMPHOCYTES # BLD AUTO: 2.4 X10E3/UL (ref 0.7–3.1)
LYMPHOCYTES NFR BLD AUTO: 42 %
MCH RBC QN AUTO: 30.2 PG (ref 26.6–33)
MCHC RBC AUTO-ENTMCNC: 33 G/DL (ref 31.5–35.7)
MCV RBC AUTO: 91 FL (ref 79–97)
MICRODELETION SYND BLD/T FISH: NORMAL
MONOCYTES # BLD AUTO: 0.6 X10E3/UL (ref 0.1–0.9)
MONOCYTES NFR BLD AUTO: 10 %
NEUTROPHILS # BLD AUTO: 2.5 X10E3/UL (ref 1.4–7)
NEUTROPHILS NFR BLD AUTO: 45 %
PLATELET # BLD AUTO: 328 X10E3/UL (ref 150–379)
POTASSIUM SERPL-SCNC: 4.4 MMOL/L (ref 3.5–5.2)
PROT SERPL-MCNC: 6.9 G/DL (ref 6–8.5)
RBC # BLD AUTO: 4.21 X10E6/UL (ref 3.77–5.28)
SL AMB EGFR AFRICAN AMERICAN: 90 ML/MIN/1.73
SL AMB EGFR NON AFRICAN AMERICAN: 78 ML/MIN/1.73
SODIUM SERPL-SCNC: 142 MMOL/L (ref 134–144)
TRIGL SERPL-MCNC: 153 MG/DL (ref 0–149)
TSH SERPL DL<=0.005 MIU/L-ACNC: 3.12 UIU/ML (ref 0.45–4.5)
WBC # BLD AUTO: 5.6 X10E3/UL (ref 3.4–10.8)

## 2019-06-14 DIAGNOSIS — I10 BENIGN ESSENTIAL HYPERTENSION: ICD-10-CM

## 2019-06-14 RX ORDER — LOSARTAN POTASSIUM 25 MG/1
TABLET ORAL
Qty: 90 TABLET | Refills: 1 | Status: SHIPPED | OUTPATIENT
Start: 2019-06-14 | End: 2019-10-21 | Stop reason: SDUPTHER

## 2019-06-21 ENCOUNTER — OFFICE VISIT (OUTPATIENT)
Dept: URGENT CARE | Facility: CLINIC | Age: 59
End: 2019-06-21
Payer: COMMERCIAL

## 2019-06-21 ENCOUNTER — APPOINTMENT (OUTPATIENT)
Dept: RADIOLOGY | Facility: CLINIC | Age: 59
End: 2019-06-21
Payer: COMMERCIAL

## 2019-06-21 VITALS
SYSTOLIC BLOOD PRESSURE: 110 MMHG | OXYGEN SATURATION: 100 % | DIASTOLIC BLOOD PRESSURE: 78 MMHG | TEMPERATURE: 98.2 F | HEIGHT: 66 IN | BODY MASS INDEX: 26.71 KG/M2 | WEIGHT: 166.2 LBS | HEART RATE: 72 BPM | RESPIRATION RATE: 16 BRPM

## 2019-06-21 DIAGNOSIS — S99.921A FOOT INJURY, RIGHT, INITIAL ENCOUNTER: Primary | ICD-10-CM

## 2019-06-21 DIAGNOSIS — S99.921A FOOT INJURY, RIGHT, INITIAL ENCOUNTER: ICD-10-CM

## 2019-06-21 PROCEDURE — 99213 OFFICE O/P EST LOW 20 MIN: CPT | Performed by: FAMILY MEDICINE

## 2019-06-21 PROCEDURE — 73630 X-RAY EXAM OF FOOT: CPT

## 2019-09-30 ENCOUNTER — OFFICE VISIT (OUTPATIENT)
Dept: FAMILY MEDICINE CLINIC | Facility: CLINIC | Age: 59
End: 2019-09-30
Payer: COMMERCIAL

## 2019-09-30 VITALS
WEIGHT: 164 LBS | BODY MASS INDEX: 26.36 KG/M2 | HEART RATE: 65 BPM | HEIGHT: 66 IN | RESPIRATION RATE: 16 BRPM | DIASTOLIC BLOOD PRESSURE: 90 MMHG | TEMPERATURE: 99.7 F | OXYGEN SATURATION: 99 % | SYSTOLIC BLOOD PRESSURE: 130 MMHG

## 2019-09-30 DIAGNOSIS — J06.9 ACUTE URI: Primary | ICD-10-CM

## 2019-09-30 PROCEDURE — 99213 OFFICE O/P EST LOW 20 MIN: CPT | Performed by: NURSE PRACTITIONER

## 2019-09-30 RX ORDER — AZITHROMYCIN 250 MG/1
TABLET, FILM COATED ORAL
Qty: 6 TABLET | Refills: 0 | Status: SHIPPED | OUTPATIENT
Start: 2019-09-30 | End: 2019-10-05

## 2019-09-30 RX ORDER — PROMETHAZINE HYDROCHLORIDE AND CODEINE PHOSPHATE 6.25; 1 MG/5ML; MG/5ML
5 SYRUP ORAL
Qty: 25 ML | Refills: 0 | Status: SHIPPED | OUTPATIENT
Start: 2019-09-30 | End: 2019-10-18 | Stop reason: SDUPTHER

## 2019-09-30 NOTE — PATIENT INSTRUCTIONS
Take medication with food  It is important that you take the entire course of antibiotics prescribed  May also take a probiotic of your choice to maintain healthy GI daron  Can take some probiotic and yogurt with the medication  Increase fluid intake, saline nasal rinses, and hot tea with honey and lemon  Cool air humidification can be helpful as well  May take Ibuprofen or Tylenol as needed for pain or fevers  Mucinex D for sinus congestion or Coricidin HBP if you have high blood pressure or a heart condition  Mucinex or Robitussin DM are effective for cough and chest congestion  Supportive care discussed and advised  Advised to RTO for any worsening and no improvement  Follow up for no improvement and worsening of conditions  Patient advised and educated when to see immediate medical care

## 2019-09-30 NOTE — PROGRESS NOTES
Assessment/Plan:    1  Acute URI  -     azithromycin (ZITHROMAX) 250 mg tablet; Take 500mg on day 1, 250mg on days 2-5  -     promethazine-codeine (PHENERGAN WITH CODEINE) 6 25-10 mg/5 mL syrup; Take 5 mL by mouth daily at bedtime          BMI Counseling: Body mass index is 26 47 kg/m²  Discussed the patient's BMI with her  The BMI is above normal  Nutrition recommendations include reducing portion sizes, decreasing overall calorie intake, 3-5 servings of fruits/vegetables daily, reducing fast food intake, consuming healthier snacks and decreasing soda and/or juice intake  Patient Instructions: Take medication with food  It is important that you take the entire course of antibiotics prescribed  May also take a probiotic of your choice to maintain healthy GI daron  Can take some probiotic and yogurt with the medication  Increase fluid intake, saline nasal rinses, and hot tea with honey and lemon  Cool air humidification can be helpful as well  May take Ibuprofen or Tylenol as needed for pain or fevers  Mucinex D for sinus congestion or Coricidin HBP if you have high blood pressure or a heart condition  Mucinex or Robitussin DM are effective for cough and chest congestion  Supportive care discussed and advised  Advised to RTO for any worsening and no improvement  Follow up for no improvement and worsening of conditions  Patient advised and educated when to see immediate medical care  Return if symptoms worsen or fail to improve  Future Appointments   Date Time Provider Dalia Falcon   10/21/2019  4:45 PM DO NIKKI Fatima [facility]           Subjective:      Patient ID: Sarah Perrin is a 61 y o  female      Chief Complaint   Patient presents with    Cold Like Symptoms     wmcma    Cough         Vitals:  /90   Pulse 65   Temp 99 7 °F (37 6 °C)   Resp 16   Ht 5' 6" (1 676 m)   Wt 74 4 kg (164 lb)   SpO2 99%   BMI 26 47 kg/m²     HPI  Patient stated that started with sore throat couple of days  Progressed to cough and congestion  Using dayquil but no relief  Denies fever, chills and sob  Denies smoking  The following portions of the patient's history were reviewed and updated as appropriate: allergies, current medications, past family history, past medical history, past social history, past surgical history and problem list       Review of Systems   Constitutional: Negative for chills, diaphoresis, fatigue, fever and unexpected weight change  HENT: Positive for congestion and sore throat  Negative for dental problem, drooling, ear discharge, ear pain, facial swelling, hearing loss, mouth sores, nosebleeds, postnasal drip, rhinorrhea, sinus pressure, sinus pain, sneezing, tinnitus, trouble swallowing and voice change  Respiratory: Positive for cough  Negative for chest tightness, shortness of breath and wheezing  Cardiovascular: Negative  Gastrointestinal: Negative for abdominal pain, constipation, diarrhea, nausea and vomiting  Genitourinary: Negative  Musculoskeletal: Negative  Skin: Negative  Neurological: Negative for dizziness, weakness, light-headedness and headaches  Hematological: Negative  Objective:    Social History     Tobacco Use   Smoking Status Never Smoker   Smokeless Tobacco Never Used       Allergies: Allergies   Allergen Reactions    Cephalexin Anaphylaxis         Current Outpatient Medications   Medication Sig Dispense Refill    EPINEPHrine (EPIPEN) 0 3 mg/0 3 mL SOAJ Inject as directed      losartan (COZAAR) 25 mg tablet TAKE 1 TABLET DAILY AS     DIRECTED 90 tablet 1    azithromycin (ZITHROMAX) 250 mg tablet Take 500mg on day 1, 250mg on days 2-5 6 tablet 0    promethazine-codeine (PHENERGAN WITH CODEINE) 6 25-10 mg/5 mL syrup Take 5 mL by mouth daily at bedtime 25 mL 0     No current facility-administered medications for this visit             Physical Exam   Constitutional: She is oriented to person, place, and time  She appears well-developed and well-nourished  HENT:   Head: Normocephalic  Right Ear: Tympanic membrane, external ear and ear canal normal    Left Ear: Tympanic membrane, external ear and ear canal normal    Nose: Mucosal edema present  Right sinus exhibits no maxillary sinus tenderness and no frontal sinus tenderness  Left sinus exhibits no maxillary sinus tenderness and no frontal sinus tenderness  Mouth/Throat: Oropharynx is clear and moist and mucous membranes are normal    Neck: Neck supple  Cardiovascular: Normal rate, regular rhythm and normal heart sounds  Pulmonary/Chest: Effort normal and breath sounds normal    Dry hacking cough  Abdominal: Normal appearance and bowel sounds are normal  There is no hepatosplenomegaly  There is no tenderness  There is no rebound  Musculoskeletal: Normal range of motion  Lymphadenopathy:        Right cervical: No superficial cervical and no posterior cervical adenopathy present  Left cervical: No superficial cervical and no posterior cervical adenopathy present  Neurological: She is alert and oriented to person, place, and time  Skin: Skin is warm and dry  Psychiatric: She has a normal mood and affect  Her behavior is normal  Judgment and thought content normal    Vitals reviewed                    Claudell Clipper, CRNP

## 2019-10-16 ENCOUNTER — OFFICE VISIT (OUTPATIENT)
Dept: FAMILY MEDICINE CLINIC | Facility: CLINIC | Age: 59
End: 2019-10-16
Payer: COMMERCIAL

## 2019-10-16 VITALS
BODY MASS INDEX: 26.84 KG/M2 | DIASTOLIC BLOOD PRESSURE: 82 MMHG | HEART RATE: 84 BPM | HEIGHT: 66 IN | SYSTOLIC BLOOD PRESSURE: 126 MMHG | RESPIRATION RATE: 18 BRPM | TEMPERATURE: 98.4 F | WEIGHT: 167 LBS

## 2019-10-16 DIAGNOSIS — J40 BRONCHITIS: Primary | ICD-10-CM

## 2019-10-16 PROCEDURE — 99213 OFFICE O/P EST LOW 20 MIN: CPT | Performed by: FAMILY MEDICINE

## 2019-10-16 PROCEDURE — 3008F BODY MASS INDEX DOCD: CPT | Performed by: FAMILY MEDICINE

## 2019-10-16 RX ORDER — METHYLPREDNISOLONE 4 MG/1
TABLET ORAL
Qty: 21 EACH | Refills: 0 | Status: SHIPPED | OUTPATIENT
Start: 2019-10-16 | End: 2020-01-20 | Stop reason: ALTCHOICE

## 2019-10-16 NOTE — PROGRESS NOTES
Assessment/Plan:   Diagnoses and all orders for this visit:    Bronchitis  -     methylPREDNISolone 4 MG tablet therapy pack; Use as directed on package  -     XR chest pa & lateral; Future  - Discussed use of Flonase for nasal congestion and PND  - Reviewed supportive care with patient including rest, staying hydrated (drink 8-10 glasses of liquids such as water, ginger ale, juice), salt water gargles, saline nasal drops, use of humidifier, frequent handwashing to prevent spread of germs, and use of over the counter decongestants, cough suppressants, Ibuprofen/Acetaminophen        Subjective:      Patient ID: Eve Linares is a 61 y o  female  HPI   Pt presents today for worsening productive cough which began approximately 1 month ago  States she was seen here on 9/30/19 and prescribed azithromycin and cough medication with codeine  States this did not help and she continues to have cough  Does admit to some mild post nasal drip and left sided congestion and ear fullness  Denies fevers, chills, headaches, dizziness, sore throat, SOB, wheezing  No hx of asthma  Cough is unrelated to foods  The following portions of the patient's history were reviewed and updated as appropriate: allergies, current medications, past family history, past medical history, past social history, past surgical history and problem list     Review of Systems   Constitutional: Negative for activity change, appetite change, chills, diaphoresis, fatigue and fever  HENT: Positive for congestion and postnasal drip  Respiratory: Positive for cough  Negative for choking, chest tightness, shortness of breath and wheezing  Cardiovascular: Negative for chest pain, palpitations and leg swelling  Gastrointestinal: Negative for abdominal distention, abdominal pain, constipation, diarrhea, nausea and vomiting     Genitourinary: Negative for difficulty urinating, dyspareunia, dysuria, enuresis, flank pain, frequency, menstrual problem, pelvic pain and urgency  Musculoskeletal: Negative for arthralgias, back pain, gait problem, joint swelling, myalgias, neck pain and neck stiffness  Skin: Negative  Neurological: Negative for dizziness, tremors, syncope, facial asymmetry, weakness, light-headedness, numbness and headaches  Psychiatric/Behavioral: Negative  Objective:      /82   Pulse 84   Temp 98 4 °F (36 9 °C)   Resp 18   Ht 5' 6" (1 676 m)   Wt 75 8 kg (167 lb)   BMI 26 95 kg/m²          Physical Exam   Constitutional: She is oriented to person, place, and time  She appears well-developed and well-nourished  No distress  HENT:   Head: Normocephalic and atraumatic  Right Ear: External ear normal    Left Ear: External ear normal    Nose: Nose normal    Mouth/Throat: Oropharynx is clear and moist  No oropharyngeal exudate  Eyes: Pupils are equal, round, and reactive to light  Conjunctivae and EOM are normal  Right eye exhibits no discharge  Left eye exhibits no discharge  No scleral icterus  Neck: Normal range of motion  Neck supple  Cardiovascular: Normal rate, regular rhythm, normal heart sounds and intact distal pulses  Exam reveals no gallop and no friction rub  No murmur heard  Pulmonary/Chest: Effort normal and breath sounds normal  No respiratory distress  She has no wheezes  She has no rales  She exhibits no tenderness  Musculoskeletal: Normal range of motion  She exhibits no edema or deformity  Lymphadenopathy:     She has no cervical adenopathy  Neurological: She is alert and oriented to person, place, and time  Skin: Skin is warm and dry  She is not diaphoretic

## 2019-10-18 ENCOUNTER — TELEPHONE (OUTPATIENT)
Dept: FAMILY MEDICINE CLINIC | Facility: CLINIC | Age: 59
End: 2019-10-18

## 2019-10-18 DIAGNOSIS — J06.9 ACUTE URI: ICD-10-CM

## 2019-10-18 RX ORDER — PROMETHAZINE HYDROCHLORIDE AND CODEINE PHOSPHATE 6.25; 1 MG/5ML; MG/5ML
5 SYRUP ORAL
Qty: 25 ML | Refills: 0 | Status: SHIPPED | OUTPATIENT
Start: 2019-10-18 | End: 2020-01-20 | Stop reason: ALTCHOICE

## 2019-10-18 NOTE — TELEPHONE ENCOUNTER
Eda Palencia saw Dr Milvia Jordan for bronchitis  She would like a day and night cough medicine  She refused at time of apt but not sleeping through the night        [employer]  [place]

## 2019-10-21 ENCOUNTER — OFFICE VISIT (OUTPATIENT)
Dept: FAMILY MEDICINE CLINIC | Facility: CLINIC | Age: 59
End: 2019-10-21
Payer: COMMERCIAL

## 2019-10-21 VITALS
TEMPERATURE: 99.1 F | BODY MASS INDEX: 26.84 KG/M2 | DIASTOLIC BLOOD PRESSURE: 70 MMHG | HEIGHT: 66 IN | SYSTOLIC BLOOD PRESSURE: 122 MMHG | WEIGHT: 167 LBS | HEART RATE: 72 BPM | RESPIRATION RATE: 18 BRPM

## 2019-10-21 DIAGNOSIS — Z12.31 SCREENING MAMMOGRAM, ENCOUNTER FOR: ICD-10-CM

## 2019-10-21 DIAGNOSIS — I10 BENIGN ESSENTIAL HYPERTENSION: Primary | ICD-10-CM

## 2019-10-21 PROCEDURE — 99213 OFFICE O/P EST LOW 20 MIN: CPT | Performed by: FAMILY MEDICINE

## 2019-10-21 PROCEDURE — 3078F DIAST BP <80 MM HG: CPT | Performed by: FAMILY MEDICINE

## 2019-10-21 PROCEDURE — 3074F SYST BP LT 130 MM HG: CPT | Performed by: FAMILY MEDICINE

## 2019-10-21 RX ORDER — LOSARTAN POTASSIUM 25 MG/1
25 TABLET ORAL DAILY
Qty: 90 TABLET | Refills: 1 | Status: SHIPPED | OUTPATIENT
Start: 2019-10-21 | End: 2020-05-28

## 2019-10-21 NOTE — PROGRESS NOTES
Assessment/Plan:    1  Benign essential hypertension  Assessment & Plan:  Well controlled   continue losartan 25    Orders:  -     CBC; Future; Expected date: 04/03/2020  -     Comprehensive metabolic panel; Future; Expected date: 04/03/2020  -     Lipid Panel with Direct LDL reflex; Future; Expected date: 04/03/2020  -     losartan (COZAAR) 25 mg tablet; Take 1 tablet (25 mg total) by mouth daily    2  Screening mammogram, encounter for  -     Mammo screening bilateral w 3d & cad; Future; Expected date: 12/06/2019             Will wait to get flu shot 2 weeks after her steroids are done, will get done at work  There are no Patient Instructions on file for this visit  Return in about 6 months (around 4/21/2020) for Annual physical     Subjective:      Patient ID: Arnulfo Viera is a 61 y o  female  Chief Complaint   Patient presents with    Follow-up     med use--lj       She is still coughing, but is slowly getting better  Hypertension-patient is tolerating her losartan well  She has not had any muscle cramps  The following portions of the patient's history were reviewed and updated as appropriate:  past social history    Review of Systems   Respiratory: Negative  Cardiovascular: Negative  Current Outpatient Medications   Medication Sig Dispense Refill    EPINEPHrine (EPIPEN) 0 3 mg/0 3 mL SOAJ Inject as directed      losartan (COZAAR) 25 mg tablet Take 1 tablet (25 mg total) by mouth daily 90 tablet 1    methylPREDNISolone 4 MG tablet therapy pack Use as directed on package 21 each 0    promethazine-codeine (PHENERGAN WITH CODEINE) 6 25-10 mg/5 mL syrup Take 5 mL by mouth daily at bedtime 25 mL 0     No current facility-administered medications for this visit          Objective:    /70   Pulse 72   Temp 99 1 °F (37 3 °C)   Resp 18   Ht 5' 6" (1 676 m)   Wt 75 8 kg (167 lb)   BMI 26 95 kg/m²        Physical Exam   Constitutional: She appears well-developed and well-nourished  HENT:   Head: Normocephalic and atraumatic  Right Ear: External ear normal    Left Ear: External ear normal    Mouth/Throat: Oropharynx is clear and moist    Cardiovascular: Normal rate, regular rhythm and normal heart sounds  Exam reveals no friction rub  No murmur heard  Pulmonary/Chest: Effort normal and breath sounds normal  No respiratory distress  She has no wheezes  She has no rales  Musculoskeletal: She exhibits no edema or deformity  Nursing note and vitals reviewed               Samaria Santana DO

## 2020-01-20 ENCOUNTER — HOSPITAL ENCOUNTER (OUTPATIENT)
Dept: RADIOLOGY | Facility: HOSPITAL | Age: 60
Discharge: HOME | End: 2020-01-20
Payer: COMMERCIAL

## 2020-01-20 ENCOUNTER — OFFICE VISIT (OUTPATIENT)
Dept: FAMILY MEDICINE CLINIC | Facility: CLINIC | Age: 60
End: 2020-01-20
Payer: COMMERCIAL

## 2020-01-20 ENCOUNTER — APPOINTMENT (OUTPATIENT)
Dept: LAB | Facility: HOSPITAL | Age: 60
End: 2020-01-20
Payer: COMMERCIAL

## 2020-01-20 ENCOUNTER — TRANSCRIBE ORDERS (OUTPATIENT)
Dept: ADMINISTRATIVE | Facility: HOSPITAL | Age: 60
End: 2020-01-20

## 2020-01-20 VITALS
WEIGHT: 171 LBS | DIASTOLIC BLOOD PRESSURE: 74 MMHG | HEART RATE: 84 BPM | BODY MASS INDEX: 27.48 KG/M2 | RESPIRATION RATE: 16 BRPM | HEIGHT: 66 IN | TEMPERATURE: 97.4 F | SYSTOLIC BLOOD PRESSURE: 126 MMHG

## 2020-01-20 DIAGNOSIS — R93.89 ABNORMAL IMAGING OF THYROID: Primary | ICD-10-CM

## 2020-01-20 DIAGNOSIS — I10 BENIGN ESSENTIAL HYPERTENSION: ICD-10-CM

## 2020-01-20 DIAGNOSIS — R93.89 ABNORMAL IMAGING OF THYROID: ICD-10-CM

## 2020-01-20 DIAGNOSIS — K21.9 GERD WITHOUT ESOPHAGITIS: ICD-10-CM

## 2020-01-20 LAB
T4 FREE SERPL-MCNC: 0.81 NG/DL (ref 0.76–1.46)
TSH SERPL DL<=0.05 MIU/L-ACNC: 2.62 UIU/ML (ref 0.36–3.74)

## 2020-01-20 PROCEDURE — 76536 US EXAM OF HEAD AND NECK: CPT

## 2020-01-20 PROCEDURE — 86376 MICROSOMAL ANTIBODY EACH: CPT

## 2020-01-20 PROCEDURE — 84443 ASSAY THYROID STIM HORMONE: CPT

## 2020-01-20 PROCEDURE — 99214 OFFICE O/P EST MOD 30 MIN: CPT | Performed by: NURSE PRACTITIONER

## 2020-01-20 PROCEDURE — 3074F SYST BP LT 130 MM HG: CPT | Performed by: NURSE PRACTITIONER

## 2020-01-20 PROCEDURE — 3078F DIAST BP <80 MM HG: CPT | Performed by: NURSE PRACTITIONER

## 2020-01-20 PROCEDURE — 3008F BODY MASS INDEX DOCD: CPT | Performed by: NURSE PRACTITIONER

## 2020-01-20 PROCEDURE — 84439 ASSAY OF FREE THYROXINE: CPT

## 2020-01-20 PROCEDURE — 36415 COLL VENOUS BLD VENIPUNCTURE: CPT

## 2020-01-20 PROCEDURE — 1036F TOBACCO NON-USER: CPT | Performed by: NURSE PRACTITIONER

## 2020-01-20 PROCEDURE — 86800 THYROGLOBULIN ANTIBODY: CPT

## 2020-01-20 NOTE — PROGRESS NOTES
Assessment/Plan:    Will check dedicated ultrasound and labs as below  Will f/u with results  Referral provided for GI  Discussed that chronic cough may be secondary to reflux  Discussed risks associated with prolonged PPI use  1  Abnormal imaging of thyroid  -     US thyroid; Future; Expected date: 01/20/2020  -     TSH, 3rd generation; Future  -     T4, free; Future  -     Thyroid Antibodies Panel; Future    2  GERD without esophagitis  -     Ambulatory referral to Gastroenterology; Future    3  Benign essential hypertension  Assessment & Plan:  Stable on current medication  Continue same  There are no Patient Instructions on file for this visit  Return for Next scheduled follow up  Subjective:      Patient ID: Jose Velazquez is a 61 y o  female  Chief Complaint   Patient presents with    Follow-up     abnormal screening--lj       Here today for thyroid check  She had a vascular screening done at work and was told that her thyroid looked abnormal   She has had labs done for her thyroid, but never any imaging  She denies any personal or family history of thyroid disease  She feels like her cough is coming back  Was treated for bronchitis in the fall  Feels a tickle in her throat  She takes Omeprazole daily  If she misses more than a few days, she develops indigestion and occasional acid reflux  She did have a colonoscopy in 2016, but no prior EGD      The following portions of the patient's history were reviewed and updated as appropriate: allergies, current medications, past family history, past medical history, past social history, past surgical history and problem list     Review of Systems   Constitutional: Negative for chills, fatigue and fever  Respiratory: Positive for cough  Negative for shortness of breath and wheezing  Cardiovascular: Negative for chest pain, palpitations and leg swelling     Gastrointestinal: Negative for abdominal pain, diarrhea, nausea and vomiting  See HPI   Endocrine: Negative for cold intolerance and heat intolerance  Skin: Negative for rash  Neurological: Negative for dizziness and headaches  Current Outpatient Medications   Medication Sig Dispense Refill    EPINEPHrine (EPIPEN) 0 3 mg/0 3 mL SOAJ Inject as directed      losartan (COZAAR) 25 mg tablet Take 1 tablet (25 mg total) by mouth daily 90 tablet 1     No current facility-administered medications for this visit  Objective:    /74   Pulse 84   Temp (!) 97 4 °F (36 3 °C)   Resp 16   Ht 5' 6" (1 676 m)   Wt 77 6 kg (171 lb)   BMI 27 60 kg/m²        Physical Exam   Constitutional: She appears well-developed and well-nourished  HENT:   Head: Normocephalic and atraumatic  Right Ear: Tympanic membrane, external ear and ear canal normal    Left Ear: Tympanic membrane, external ear and ear canal normal    Nose: No mucosal edema or rhinorrhea  Mouth/Throat: Uvula is midline, oropharynx is clear and moist and mucous membranes are normal    Eyes: Conjunctivae are normal    Neck: Neck supple  No edema present  No thyroid mass and no thyromegaly present  Cardiovascular: Normal rate, regular rhythm, normal heart sounds and intact distal pulses  No murmur heard  Pulmonary/Chest: Effort normal and breath sounds normal    Abdominal: Bowel sounds are normal  She exhibits no distension  There is no splenomegaly or hepatomegaly  There is no tenderness  Lymphadenopathy:        Right cervical: No superficial cervical adenopathy present  Left cervical: No superficial cervical adenopathy present  Skin: Skin is warm, dry and intact  No rash noted  Psychiatric: She has a normal mood and affect  Nursing note and vitals reviewed               Lillian Belle

## 2020-01-22 ENCOUNTER — TELEPHONE (OUTPATIENT)
Dept: FAMILY MEDICINE CLINIC | Facility: CLINIC | Age: 60
End: 2020-01-22

## 2020-01-22 LAB
THYROGLOB AB SERPL-ACNC: 10.1 IU/ML (ref 0–0.9)
THYROPEROXIDASE AB SERPL-ACNC: 41 IU/ML (ref 0–34)

## 2020-01-22 NOTE — TELEPHONE ENCOUNTER
Discussed results w/ pt  Labs and thyroid US consistent with Hashimoto's thyroiditis  Her TSH and free T4 levels are WNL and she is currently asymptomatic  Recommended repeating both labs and US in 1 year, or sooner if she would become symptomatic  All questions answered   Berry Lorenzo

## 2020-02-12 DIAGNOSIS — I10 BENIGN ESSENTIAL HYPERTENSION: ICD-10-CM

## 2020-02-12 DIAGNOSIS — Z79.899 ENCOUNTER FOR LONG-TERM CURRENT USE OF MEDICATION: Primary | ICD-10-CM

## 2020-02-13 ENCOUNTER — TELEPHONE (OUTPATIENT)
Dept: FAMILY MEDICINE CLINIC | Facility: CLINIC | Age: 60
End: 2020-02-13

## 2020-02-13 DIAGNOSIS — Z79.899 ENCOUNTER FOR LONG-TERM CURRENT USE OF MEDICATION: Primary | ICD-10-CM

## 2020-02-13 DIAGNOSIS — K21.9 GERD WITHOUT ESOPHAGITIS: ICD-10-CM

## 2020-02-13 RX ORDER — OMEPRAZOLE 40 MG/1
CAPSULE, DELAYED RELEASE ORAL
COMMUNITY
Start: 2020-01-27 | End: 2021-02-11 | Stop reason: SDUPTHER

## 2020-02-13 NOTE — TELEPHONE ENCOUNTER
Patient stated that Dr Truman Evans order her blood work and she called her insurance to see if they would be covered  Her insurance stated that if Dr Truman Evans would provide a letter of medical necessity than she could submit that to the insurance  Is Dr Truman Evans willing to write this letter? She also stressed that she really would like this blood work done especially the Magnesium that was order  Please call patient and let her know  OK to leave voicemail

## 2020-02-13 NOTE — LETTER
February 13, 2020     Patient: Jose Angel Vasquez   YOB: 1960   Date of Visit: 2/13/2020       To Whom it May Concern:    Jose Angel Vasquez is under my professional care  It is medically necessary that a magnesium level be monitored for her  She is on Omeprazole which can lower this level and for her safety it needs to be evaluated  If you have any questions or concerns, please don't hesitate to call           Sincerely,        Sierra Meeks DO       CC: No Recipients

## 2020-02-13 NOTE — TELEPHONE ENCOUNTER
I wrote a new order for just a magnesium and wrote a letter of medical necessity    Letter and order in her chart  Reuben Veronica, DO

## 2020-02-17 ENCOUNTER — APPOINTMENT (OUTPATIENT)
Dept: LAB | Facility: HOSPITAL | Age: 60
End: 2020-02-17
Payer: COMMERCIAL

## 2020-02-17 DIAGNOSIS — I10 BENIGN ESSENTIAL HYPERTENSION: ICD-10-CM

## 2020-02-17 DIAGNOSIS — K21.9 GERD WITHOUT ESOPHAGITIS: ICD-10-CM

## 2020-02-17 DIAGNOSIS — Z79.899 ENCOUNTER FOR LONG-TERM CURRENT USE OF MEDICATION: ICD-10-CM

## 2020-02-17 LAB
25(OH)D3 SERPL-MCNC: 41.8 NG/ML (ref 30–100)
MAGNESIUM SERPL-MCNC: 2 MG/DL (ref 1.6–2.6)

## 2020-02-17 PROCEDURE — 83735 ASSAY OF MAGNESIUM: CPT

## 2020-02-17 PROCEDURE — 36415 COLL VENOUS BLD VENIPUNCTURE: CPT

## 2020-02-17 PROCEDURE — 82306 VITAMIN D 25 HYDROXY: CPT

## 2020-02-25 ENCOUNTER — HOSPITAL ENCOUNTER (OUTPATIENT)
Dept: RADIOLOGY | Facility: HOSPITAL | Age: 60
Discharge: HOME | End: 2020-02-25
Payer: COMMERCIAL

## 2020-02-25 VITALS — HEIGHT: 66 IN | WEIGHT: 171 LBS | BODY MASS INDEX: 27.48 KG/M2

## 2020-02-25 DIAGNOSIS — Z12.31 SCREENING MAMMOGRAM, ENCOUNTER FOR: ICD-10-CM

## 2020-02-25 PROCEDURE — 77067 SCR MAMMO BI INCL CAD: CPT

## 2020-02-25 PROCEDURE — 77063 BREAST TOMOSYNTHESIS BI: CPT

## 2020-05-28 DIAGNOSIS — I10 BENIGN ESSENTIAL HYPERTENSION: ICD-10-CM

## 2020-05-28 RX ORDER — LOSARTAN POTASSIUM 25 MG/1
TABLET ORAL
Qty: 90 TABLET | Refills: 0 | Status: SHIPPED | OUTPATIENT
Start: 2020-05-28 | End: 2020-08-26

## 2020-08-26 DIAGNOSIS — I10 BENIGN ESSENTIAL HYPERTENSION: ICD-10-CM

## 2020-08-26 RX ORDER — LOSARTAN POTASSIUM 25 MG/1
TABLET ORAL
Qty: 90 TABLET | Refills: 0 | Status: SHIPPED | OUTPATIENT
Start: 2020-08-26 | End: 2020-10-28 | Stop reason: SDUPTHER

## 2020-10-28 ENCOUNTER — TELEPHONE (OUTPATIENT)
Dept: FAMILY MEDICINE CLINIC | Facility: CLINIC | Age: 60
End: 2020-10-28

## 2020-10-28 ENCOUNTER — OFFICE VISIT (OUTPATIENT)
Dept: FAMILY MEDICINE CLINIC | Facility: CLINIC | Age: 60
End: 2020-10-28
Payer: COMMERCIAL

## 2020-10-28 VITALS
DIASTOLIC BLOOD PRESSURE: 76 MMHG | HEART RATE: 76 BPM | TEMPERATURE: 96.1 F | HEIGHT: 66 IN | RESPIRATION RATE: 16 BRPM | BODY MASS INDEX: 27.48 KG/M2 | SYSTOLIC BLOOD PRESSURE: 120 MMHG | WEIGHT: 171 LBS

## 2020-10-28 DIAGNOSIS — Z23 NEED FOR VACCINATION: ICD-10-CM

## 2020-10-28 DIAGNOSIS — E07.9 THYROID DISORDER: ICD-10-CM

## 2020-10-28 DIAGNOSIS — Z78.0 POSTMENOPAUSAL: ICD-10-CM

## 2020-10-28 DIAGNOSIS — Z79.899 ENCOUNTER FOR LONG-TERM CURRENT USE OF MEDICATION: ICD-10-CM

## 2020-10-28 DIAGNOSIS — I10 BENIGN ESSENTIAL HYPERTENSION: ICD-10-CM

## 2020-10-28 DIAGNOSIS — Z00.00 ANNUAL PHYSICAL EXAM: Primary | ICD-10-CM

## 2020-10-28 DIAGNOSIS — I10 BENIGN ESSENTIAL HYPERTENSION: Primary | ICD-10-CM

## 2020-10-28 DIAGNOSIS — Z11.59 NEED FOR HEPATITIS C SCREENING TEST: ICD-10-CM

## 2020-10-28 DIAGNOSIS — K43.9 VENTRAL HERNIA WITHOUT OBSTRUCTION OR GANGRENE: ICD-10-CM

## 2020-10-28 DIAGNOSIS — Z12.31 SCREENING MAMMOGRAM, ENCOUNTER FOR: ICD-10-CM

## 2020-10-28 PROCEDURE — 3078F DIAST BP <80 MM HG: CPT | Performed by: FAMILY MEDICINE

## 2020-10-28 PROCEDURE — 90750 HZV VACC RECOMBINANT IM: CPT

## 2020-10-28 PROCEDURE — 3008F BODY MASS INDEX DOCD: CPT | Performed by: FAMILY MEDICINE

## 2020-10-28 PROCEDURE — 90682 RIV4 VACC RECOMBINANT DNA IM: CPT

## 2020-10-28 PROCEDURE — 3074F SYST BP LT 130 MM HG: CPT | Performed by: FAMILY MEDICINE

## 2020-10-28 PROCEDURE — 90471 IMMUNIZATION ADMIN: CPT

## 2020-10-28 PROCEDURE — 99396 PREV VISIT EST AGE 40-64: CPT | Performed by: FAMILY MEDICINE

## 2020-10-28 PROCEDURE — 90472 IMMUNIZATION ADMIN EACH ADD: CPT

## 2020-10-28 RX ORDER — LOSARTAN POTASSIUM 25 MG/1
25 TABLET ORAL DAILY
Qty: 90 TABLET | Refills: 1 | Status: SHIPPED | OUTPATIENT
Start: 2020-10-28 | End: 2021-01-02 | Stop reason: SDUPTHER

## 2020-10-29 LAB
ALBUMIN SERPL-MCNC: 4.6 G/DL (ref 3.8–4.9)
ALBUMIN/GLOB SERPL: 1.9 {RATIO} (ref 1.2–2.2)
ALP SERPL-CCNC: 107 IU/L (ref 39–117)
ALT SERPL-CCNC: 17 IU/L (ref 0–32)
AST SERPL-CCNC: 18 IU/L (ref 0–40)
BASOPHILS # BLD AUTO: 0.1 X10E3/UL (ref 0–0.2)
BASOPHILS NFR BLD AUTO: 1 %
BILIRUB SERPL-MCNC: 0.3 MG/DL (ref 0–1.2)
BUN SERPL-MCNC: 16 MG/DL (ref 8–27)
BUN/CREAT SERPL: 18 (ref 12–28)
CALCIUM SERPL-MCNC: 9.9 MG/DL (ref 8.7–10.3)
CHLORIDE SERPL-SCNC: 103 MMOL/L (ref 96–106)
CHOLEST SERPL-MCNC: 216 MG/DL (ref 100–199)
CO2 SERPL-SCNC: 26 MMOL/L (ref 20–29)
CREAT SERPL-MCNC: 0.87 MG/DL (ref 0.57–1)
EOSINOPHIL # BLD AUTO: 0.1 X10E3/UL (ref 0–0.4)
EOSINOPHIL NFR BLD AUTO: 2 %
ERYTHROCYTE [DISTWIDTH] IN BLOOD BY AUTOMATED COUNT: 12.9 % (ref 11.7–15.4)
GLOBULIN SER-MCNC: 2.4 G/DL (ref 1.5–4.5)
GLUCOSE SERPL-MCNC: 96 MG/DL (ref 65–99)
HCT VFR BLD AUTO: 39.4 % (ref 34–46.6)
HCV AB S/CO SERPL IA: <0.1 S/CO RATIO (ref 0–0.9)
HDLC SERPL-MCNC: 62 MG/DL
HGB BLD-MCNC: 13 G/DL (ref 11.1–15.9)
IMM GRANULOCYTES # BLD: 0 X10E3/UL (ref 0–0.1)
IMM GRANULOCYTES NFR BLD: 0 %
LDLC SERPL CALC-MCNC: 127 MG/DL (ref 0–99)
LYMPHOCYTES # BLD AUTO: 1.9 X10E3/UL (ref 0.7–3.1)
LYMPHOCYTES NFR BLD AUTO: 36 %
MAGNESIUM SERPL-MCNC: 2 MG/DL (ref 1.6–2.3)
MCH RBC QN AUTO: 30.2 PG (ref 26.6–33)
MCHC RBC AUTO-ENTMCNC: 33 G/DL (ref 31.5–35.7)
MCV RBC AUTO: 91 FL (ref 79–97)
MICRODELETION SYND BLD/T FISH: NORMAL
MONOCYTES # BLD AUTO: 0.5 X10E3/UL (ref 0.1–0.9)
MONOCYTES NFR BLD AUTO: 10 %
NEUTROPHILS # BLD AUTO: 2.7 X10E3/UL (ref 1.4–7)
NEUTROPHILS NFR BLD AUTO: 51 %
PLATELET # BLD AUTO: 278 X10E3/UL (ref 150–450)
POTASSIUM SERPL-SCNC: 4.6 MMOL/L (ref 3.5–5.2)
PROT SERPL-MCNC: 7 G/DL (ref 6–8.5)
RBC # BLD AUTO: 4.31 X10E6/UL (ref 3.77–5.28)
SL AMB EGFR AFRICAN AMERICAN: 84 ML/MIN/1.73
SL AMB EGFR NON AFRICAN AMERICAN: 73 ML/MIN/1.73
SODIUM SERPL-SCNC: 140 MMOL/L (ref 134–144)
T3 SERPL-MCNC: 96 NG/DL (ref 71–180)
T4 FREE SERPL-MCNC: 1.12 NG/DL (ref 0.82–1.77)
TRIGL SERPL-MCNC: 152 MG/DL (ref 0–149)
TSH SERPL DL<=0.005 MIU/L-ACNC: 3.13 UIU/ML (ref 0.45–4.5)
VIT B12 SERPL-MCNC: 816 PG/ML (ref 232–1245)
WBC # BLD AUTO: 5.2 X10E3/UL (ref 3.4–10.8)

## 2020-11-07 ENCOUNTER — HOSPITAL ENCOUNTER (OUTPATIENT)
Dept: RADIOLOGY | Facility: HOSPITAL | Age: 60
Discharge: HOME | End: 2020-11-07
Payer: COMMERCIAL

## 2020-11-07 DIAGNOSIS — K43.9 VENTRAL HERNIA WITHOUT OBSTRUCTION OR GANGRENE: ICD-10-CM

## 2020-11-07 PROCEDURE — 74176 CT ABD & PELVIS W/O CONTRAST: CPT

## 2020-11-07 PROCEDURE — G1004 CDSM NDSC: HCPCS

## 2021-01-02 DIAGNOSIS — I10 BENIGN ESSENTIAL HYPERTENSION: ICD-10-CM

## 2021-01-04 DIAGNOSIS — I10 BENIGN ESSENTIAL HYPERTENSION: ICD-10-CM

## 2021-01-04 RX ORDER — LOSARTAN POTASSIUM 25 MG/1
25 TABLET ORAL DAILY
Qty: 90 TABLET | Refills: 1 | Status: SHIPPED | OUTPATIENT
Start: 2021-01-04 | End: 2021-06-04 | Stop reason: SDUPTHER

## 2021-01-04 RX ORDER — LOSARTAN POTASSIUM 25 MG/1
25 TABLET ORAL DAILY
Qty: 90 TABLET | Refills: 1 | Status: SHIPPED | OUTPATIENT
Start: 2021-01-04 | End: 2021-01-04 | Stop reason: SDUPTHER

## 2021-02-11 DIAGNOSIS — K21.9 GERD WITHOUT ESOPHAGITIS: Primary | ICD-10-CM

## 2021-02-11 DIAGNOSIS — B00.9 HERPES SIMPLEX: ICD-10-CM

## 2021-02-11 RX ORDER — VALACYCLOVIR HYDROCHLORIDE 1 G/1
TABLET, FILM COATED ORAL
Qty: 20 TABLET | Refills: 3 | Status: SHIPPED | OUTPATIENT
Start: 2021-02-11 | End: 2022-06-06

## 2021-02-11 RX ORDER — OMEPRAZOLE 40 MG/1
40 CAPSULE, DELAYED RELEASE ORAL DAILY
Qty: 90 CAPSULE | Refills: 1 | Status: SHIPPED | OUTPATIENT
Start: 2021-02-11 | End: 2021-12-06

## 2021-03-10 DIAGNOSIS — Z23 ENCOUNTER FOR IMMUNIZATION: ICD-10-CM

## 2021-06-04 ENCOUNTER — OFFICE VISIT (OUTPATIENT)
Dept: FAMILY MEDICINE CLINIC | Facility: CLINIC | Age: 61
End: 2021-06-04
Payer: COMMERCIAL

## 2021-06-04 VITALS
RESPIRATION RATE: 16 BRPM | SYSTOLIC BLOOD PRESSURE: 120 MMHG | TEMPERATURE: 97.9 F | HEIGHT: 66 IN | HEART RATE: 60 BPM | BODY MASS INDEX: 27.16 KG/M2 | OXYGEN SATURATION: 100 % | WEIGHT: 169 LBS | DIASTOLIC BLOOD PRESSURE: 84 MMHG

## 2021-06-04 DIAGNOSIS — I10 BENIGN ESSENTIAL HYPERTENSION: Primary | ICD-10-CM

## 2021-06-04 DIAGNOSIS — Z12.31 SCREENING MAMMOGRAM, ENCOUNTER FOR: ICD-10-CM

## 2021-06-04 DIAGNOSIS — E06.3 HASHIMOTO'S THYROIDITIS: ICD-10-CM

## 2021-06-04 DIAGNOSIS — Z78.0 POSTMENOPAUSAL: ICD-10-CM

## 2021-06-04 DIAGNOSIS — Z23 NEED FOR VACCINATION: ICD-10-CM

## 2021-06-04 PROCEDURE — 90750 HZV VACC RECOMBINANT IM: CPT

## 2021-06-04 PROCEDURE — 90471 IMMUNIZATION ADMIN: CPT

## 2021-06-04 PROCEDURE — 3725F SCREEN DEPRESSION PERFORMED: CPT | Performed by: FAMILY MEDICINE

## 2021-06-04 PROCEDURE — 3079F DIAST BP 80-89 MM HG: CPT | Performed by: FAMILY MEDICINE

## 2021-06-04 PROCEDURE — 3074F SYST BP LT 130 MM HG: CPT | Performed by: FAMILY MEDICINE

## 2021-06-04 PROCEDURE — 3008F BODY MASS INDEX DOCD: CPT | Performed by: FAMILY MEDICINE

## 2021-06-04 PROCEDURE — 1036F TOBACCO NON-USER: CPT | Performed by: FAMILY MEDICINE

## 2021-06-04 PROCEDURE — 99213 OFFICE O/P EST LOW 20 MIN: CPT | Performed by: FAMILY MEDICINE

## 2021-06-04 RX ORDER — LOSARTAN POTASSIUM 25 MG/1
25 TABLET ORAL DAILY
Qty: 90 TABLET | Refills: 1 | Status: SHIPPED | OUTPATIENT
Start: 2021-06-04 | End: 2022-01-10

## 2021-06-04 NOTE — PROGRESS NOTES
Assessment/Plan:    1  Benign essential hypertension  Assessment & Plan:  Well controlled   Continue losartan 25 mg daily    Orders:  -     CBC; Future; Expected date: 11/16/2021  -     Comprehensive metabolic panel; Future; Expected date: 11/16/2021  -     Lipid Panel with Direct LDL reflex; Future; Expected date: 11/16/2021  -     losartan (COZAAR) 25 mg tablet; Take 1 tablet (25 mg total) by mouth daily    2  Hashimoto's thyroiditis  Assessment & Plan: On ultrasound done for work   Will continue to monitor labs    Orders:  -     T4, free; Future; Expected date: 11/16/2021  -     TSH, 3rd generation; Future; Expected date: 11/16/2021  -     Thyroglobulin Panel; Future; Expected date: 11/16/2021  -     Anti-microsomal antibody; Future; Expected date: 11/16/2021    3  Screening mammogram, encounter for  -     Mammo screening bilateral w 3d & cad; Future; Expected date: 06/04/2021    4  Need for vaccination  -     Zoster Vaccine Recombinant IM    5  Postmenopausal  -     DXA bone density spine hip and pelvis; Future; Expected date: 06/04/2021  she had both of her COVID vaccines   BMI Counseling: Body mass index is 27 28 kg/m²  The BMI is above normal  Exercise recommendations include exercising 3-5 times per week  There are no Patient Instructions on file for this visit  Return in about 6 months (around 12/4/2021) for Annual physical     Subjective:      Patient ID: Estrella Giron is a 61 y o  female  Chief Complaint   Patient presents with    Follow-up     6 month mz cma       Hypertension - patient has been taking her blood pressure medication regularly  Associated symptoms:  Swelling:  no  Leg cramps: no    Her Baker's cyst behind her left knee is acting up  She has been busy cleaning out her father's house    Her knees ache at night       The following portions of the patient's history were reviewed and updated as appropriate:  past social history    Review of Systems   Constitutional: Negative  Respiratory: Negative  Cardiovascular: Negative  Current Outpatient Medications   Medication Sig Dispense Refill    EPINEPHrine (EPIPEN) 0 3 mg/0 3 mL SOAJ Inject as directed      losartan (COZAAR) 25 mg tablet Take 1 tablet (25 mg total) by mouth daily 90 tablet 1    omeprazole (PriLOSEC) 40 MG capsule Take 1 capsule (40 mg total) by mouth daily 90 capsule 1    valACYclovir (VALTREX) 1,000 mg tablet Take 2 at onset of cold sore and repeat in 12 hours  20 tablet 3     No current facility-administered medications for this visit  Objective:    /84   Pulse 60   Temp 97 9 °F (36 6 °C)   Resp 16   Ht 5' 6" (1 676 m)   Wt 76 7 kg (169 lb)   SpO2 100%   BMI 27 28 kg/m²      Physical Exam  Vitals signs and nursing note reviewed  Constitutional:       Appearance: She is well-developed  HENT:      Head: Normocephalic and atraumatic  Right Ear: Tympanic membrane and external ear normal       Left Ear: Tympanic membrane and external ear normal    Cardiovascular:      Rate and Rhythm: Normal rate and regular rhythm  Heart sounds: Normal heart sounds  No murmur  No friction rub  Pulmonary:      Effort: Pulmonary effort is normal  No respiratory distress  Breath sounds: Normal breath sounds  No wheezing or rales  Musculoskeletal:      Right lower leg: No edema  Left lower leg: No edema               Tonya Brilliant, DO

## 2021-06-16 ENCOUNTER — HOSPITAL ENCOUNTER (OUTPATIENT)
Dept: RADIOLOGY | Facility: HOSPITAL | Age: 61
Discharge: HOME | End: 2021-06-16
Payer: COMMERCIAL

## 2021-06-16 VITALS — BODY MASS INDEX: 26.84 KG/M2 | HEIGHT: 66 IN | WEIGHT: 167 LBS

## 2021-06-16 DIAGNOSIS — Z12.31 SCREENING MAMMOGRAM, ENCOUNTER FOR: ICD-10-CM

## 2021-06-16 DIAGNOSIS — Z78.0 POSTMENOPAUSAL: ICD-10-CM

## 2021-06-16 PROCEDURE — 77063 BREAST TOMOSYNTHESIS BI: CPT

## 2021-06-16 PROCEDURE — 77080 DXA BONE DENSITY AXIAL: CPT

## 2021-06-16 PROCEDURE — 77067 SCR MAMMO BI INCL CAD: CPT

## 2021-06-17 PROBLEM — M85.80 LOW BONE MASS: Status: ACTIVE | Noted: 2021-06-17

## 2021-10-26 ENCOUNTER — OFFICE VISIT (OUTPATIENT)
Dept: FAMILY MEDICINE CLINIC | Facility: CLINIC | Age: 61
End: 2021-10-26
Payer: COMMERCIAL

## 2021-10-26 VITALS
DIASTOLIC BLOOD PRESSURE: 80 MMHG | WEIGHT: 174 LBS | BODY MASS INDEX: 27.97 KG/M2 | SYSTOLIC BLOOD PRESSURE: 118 MMHG | TEMPERATURE: 97.1 F | RESPIRATION RATE: 16 BRPM | HEIGHT: 66 IN | HEART RATE: 84 BPM

## 2021-10-26 DIAGNOSIS — I10 BENIGN ESSENTIAL HYPERTENSION: Primary | ICD-10-CM

## 2021-10-26 DIAGNOSIS — Z23 NEED FOR VACCINATION: ICD-10-CM

## 2021-10-26 PROCEDURE — 3079F DIAST BP 80-89 MM HG: CPT | Performed by: NURSE PRACTITIONER

## 2021-10-26 PROCEDURE — 3008F BODY MASS INDEX DOCD: CPT | Performed by: NURSE PRACTITIONER

## 2021-10-26 PROCEDURE — 90682 RIV4 VACC RECOMBINANT DNA IM: CPT

## 2021-10-26 PROCEDURE — 90471 IMMUNIZATION ADMIN: CPT

## 2021-10-26 PROCEDURE — 3074F SYST BP LT 130 MM HG: CPT | Performed by: NURSE PRACTITIONER

## 2021-10-26 PROCEDURE — 99213 OFFICE O/P EST LOW 20 MIN: CPT | Performed by: NURSE PRACTITIONER

## 2021-11-26 ENCOUNTER — APPOINTMENT (OUTPATIENT)
Dept: LAB | Facility: HOSPITAL | Age: 61
End: 2021-11-26
Payer: COMMERCIAL

## 2021-11-26 DIAGNOSIS — E06.3 HASHIMOTO'S THYROIDITIS: ICD-10-CM

## 2021-11-26 DIAGNOSIS — I10 BENIGN ESSENTIAL HYPERTENSION: ICD-10-CM

## 2021-11-26 LAB
ALBUMIN SERPL BCP-MCNC: 3.7 G/DL (ref 3.5–5)
ALP SERPL-CCNC: 94 U/L (ref 46–116)
ALT SERPL W P-5'-P-CCNC: 28 U/L (ref 12–78)
ANION GAP SERPL CALCULATED.3IONS-SCNC: 8 MMOL/L (ref 4–13)
AST SERPL W P-5'-P-CCNC: 20 U/L (ref 5–45)
BILIRUB SERPL-MCNC: 0.2 MG/DL (ref 0.2–1)
BUN SERPL-MCNC: 21 MG/DL (ref 5–25)
CALCIUM SERPL-MCNC: 8.8 MG/DL (ref 8.3–10.1)
CHLORIDE SERPL-SCNC: 105 MMOL/L (ref 100–108)
CHOLEST SERPL-MCNC: 221 MG/DL
CO2 SERPL-SCNC: 29 MMOL/L (ref 21–32)
CREAT SERPL-MCNC: 0.91 MG/DL (ref 0.6–1.3)
ERYTHROCYTE [DISTWIDTH] IN BLOOD BY AUTOMATED COUNT: 14.2 % (ref 11.6–15.1)
GFR SERPL CREATININE-BSD FRML MDRD: 68 ML/MIN/1.73SQ M
GLUCOSE P FAST SERPL-MCNC: 98 MG/DL (ref 65–99)
HCT VFR BLD AUTO: 38 % (ref 34.8–46.1)
HDLC SERPL-MCNC: 66 MG/DL
HGB BLD-MCNC: 11.7 G/DL (ref 11.5–15.4)
LDLC SERPL CALC-MCNC: 127 MG/DL (ref 0–100)
MCH RBC QN AUTO: 29.5 PG (ref 26.8–34.3)
MCHC RBC AUTO-ENTMCNC: 30.8 G/DL (ref 31.4–37.4)
MCV RBC AUTO: 96 FL (ref 82–98)
PLATELET # BLD AUTO: 282 THOUSANDS/UL (ref 149–390)
PMV BLD AUTO: 10.5 FL (ref 8.9–12.7)
POTASSIUM SERPL-SCNC: 4 MMOL/L (ref 3.5–5.3)
PROT SERPL-MCNC: 7.4 G/DL (ref 6.4–8.2)
RBC # BLD AUTO: 3.97 MILLION/UL (ref 3.81–5.12)
SODIUM SERPL-SCNC: 142 MMOL/L (ref 136–145)
T4 FREE SERPL-MCNC: 0.86 NG/DL (ref 0.76–1.46)
TRIGL SERPL-MCNC: 138 MG/DL
TSH SERPL DL<=0.05 MIU/L-ACNC: 3.49 UIU/ML (ref 0.36–3.74)
WBC # BLD AUTO: 5.34 THOUSAND/UL (ref 4.31–10.16)

## 2021-11-26 PROCEDURE — 84432 ASSAY OF THYROGLOBULIN: CPT

## 2021-11-26 PROCEDURE — 84439 ASSAY OF FREE THYROXINE: CPT

## 2021-11-26 PROCEDURE — 80053 COMPREHEN METABOLIC PANEL: CPT

## 2021-11-26 PROCEDURE — 86800 THYROGLOBULIN ANTIBODY: CPT

## 2021-11-26 PROCEDURE — 84443 ASSAY THYROID STIM HORMONE: CPT

## 2021-11-26 PROCEDURE — 86376 MICROSOMAL ANTIBODY EACH: CPT

## 2021-11-26 PROCEDURE — 36415 COLL VENOUS BLD VENIPUNCTURE: CPT

## 2021-11-26 PROCEDURE — 85027 COMPLETE CBC AUTOMATED: CPT

## 2021-11-26 PROCEDURE — 80061 LIPID PANEL: CPT

## 2021-11-27 LAB — THYROPEROXIDASE AB SERPL-ACNC: 34 IU/ML (ref 0–34)

## 2021-11-30 ENCOUNTER — RA CDI HCC (OUTPATIENT)
Dept: OTHER | Facility: HOSPITAL | Age: 61
End: 2021-11-30

## 2021-12-05 DIAGNOSIS — K21.9 GERD WITHOUT ESOPHAGITIS: ICD-10-CM

## 2021-12-06 ENCOUNTER — OFFICE VISIT (OUTPATIENT)
Dept: FAMILY MEDICINE CLINIC | Facility: CLINIC | Age: 61
End: 2021-12-06
Payer: COMMERCIAL

## 2021-12-06 VITALS
OXYGEN SATURATION: 97 % | HEIGHT: 65 IN | TEMPERATURE: 98.3 F | SYSTOLIC BLOOD PRESSURE: 122 MMHG | WEIGHT: 175 LBS | RESPIRATION RATE: 18 BRPM | BODY MASS INDEX: 29.16 KG/M2 | DIASTOLIC BLOOD PRESSURE: 78 MMHG | HEART RATE: 74 BPM

## 2021-12-06 DIAGNOSIS — E06.3 HASHIMOTO'S THYROIDITIS: ICD-10-CM

## 2021-12-06 DIAGNOSIS — K21.9 GERD WITHOUT ESOPHAGITIS: ICD-10-CM

## 2021-12-06 DIAGNOSIS — I10 BENIGN ESSENTIAL HYPERTENSION: ICD-10-CM

## 2021-12-06 DIAGNOSIS — Z00.00 ANNUAL PHYSICAL EXAM: Primary | ICD-10-CM

## 2021-12-06 PROCEDURE — 3074F SYST BP LT 130 MM HG: CPT | Performed by: FAMILY MEDICINE

## 2021-12-06 PROCEDURE — 3008F BODY MASS INDEX DOCD: CPT | Performed by: FAMILY MEDICINE

## 2021-12-06 PROCEDURE — 99396 PREV VISIT EST AGE 40-64: CPT | Performed by: FAMILY MEDICINE

## 2021-12-06 PROCEDURE — 3078F DIAST BP <80 MM HG: CPT | Performed by: FAMILY MEDICINE

## 2021-12-06 PROCEDURE — 1036F TOBACCO NON-USER: CPT | Performed by: FAMILY MEDICINE

## 2021-12-06 RX ORDER — OMEPRAZOLE 40 MG/1
40 CAPSULE, DELAYED RELEASE ORAL DAILY
Qty: 90 CAPSULE | Refills: 3 | Status: SHIPPED | OUTPATIENT
Start: 2021-12-06

## 2021-12-06 RX ORDER — MULTIVIT-MIN/IRON FUM/FOLIC AC 7.5 MG-4
1 TABLET ORAL DAILY
COMMUNITY

## 2021-12-06 RX ORDER — OMEPRAZOLE 40 MG/1
CAPSULE, DELAYED RELEASE ORAL
Qty: 90 CAPSULE | Refills: 1 | Status: SHIPPED | OUTPATIENT
Start: 2021-12-06 | End: 2021-12-06 | Stop reason: SDUPTHER

## 2021-12-07 LAB
THYROGLOB AB SERPL-ACNC: 8.2 IU/ML (ref 0–0.9)
THYROGLOB SERPL-MCNC: 103 NG/ML

## 2022-01-10 DIAGNOSIS — I10 BENIGN ESSENTIAL HYPERTENSION: ICD-10-CM

## 2022-01-10 RX ORDER — LOSARTAN POTASSIUM 25 MG/1
TABLET ORAL
Qty: 90 TABLET | Refills: 1 | Status: SHIPPED | OUTPATIENT
Start: 2022-01-10 | End: 2022-07-13

## 2022-06-06 ENCOUNTER — OFFICE VISIT (OUTPATIENT)
Dept: FAMILY MEDICINE CLINIC | Facility: CLINIC | Age: 62
End: 2022-06-06
Payer: COMMERCIAL

## 2022-06-06 VITALS
DIASTOLIC BLOOD PRESSURE: 80 MMHG | HEIGHT: 65 IN | OXYGEN SATURATION: 98 % | BODY MASS INDEX: 29.12 KG/M2 | RESPIRATION RATE: 18 BRPM | WEIGHT: 174.8 LBS | SYSTOLIC BLOOD PRESSURE: 122 MMHG | TEMPERATURE: 97.3 F | HEART RATE: 81 BPM

## 2022-06-06 DIAGNOSIS — Z12.31 ENCOUNTER FOR SCREENING MAMMOGRAM FOR BREAST CANCER: ICD-10-CM

## 2022-06-06 DIAGNOSIS — I10 BENIGN ESSENTIAL HYPERTENSION: Primary | ICD-10-CM

## 2022-06-06 DIAGNOSIS — D17.1 LIPOMA OF BACK: ICD-10-CM

## 2022-06-06 DIAGNOSIS — E06.3 HASHIMOTO'S THYROIDITIS: ICD-10-CM

## 2022-06-06 PROCEDURE — 3725F SCREEN DEPRESSION PERFORMED: CPT | Performed by: FAMILY MEDICINE

## 2022-06-06 PROCEDURE — 99214 OFFICE O/P EST MOD 30 MIN: CPT | Performed by: FAMILY MEDICINE

## 2022-06-06 NOTE — PROGRESS NOTES
Assessment/Plan:    1  Benign essential hypertension  Assessment & Plan:  Well controlled  continue losartan 25 milligrams daily    Orders:  -     CBC; Future; Expected date: 11/18/2022  -     Comprehensive metabolic panel; Future; Expected date: 11/18/2022  -     Lipid Panel with Direct LDL reflex; Future; Expected date: 11/18/2022    2  Encounter for screening mammogram for breast cancer  -     Mammo screening bilateral w 3d & cad; Future; Expected date: 06/17/2022    3  Hashimoto's thyroiditis  Assessment & Plan:  Stable   currently not on any medication    Orders:  -     TSH, 3rd generation; Future; Expected date: 11/18/2022  -     T4, free; Future; Expected date: 11/18/2022    4  Lipoma of back  Assessment & Plan:  Concerned that it may be getting larger  referred to surgery for further evaluation    Orders:  -     Ambulatory referral to General Surgery; Future      BMI Counseling: Body mass index is 28 87 kg/m²  The BMI is above normal  Exercise recommendations include exercising 3-5 times per week  Rationale for BMI follow-up plan is due to patient being overweight or obese  Depression Screening and Follow-up Plan: Patient was screened for depression during today's encounter  They screened negative with a PHQ-2 score of 0  There are no Patient Instructions on file for this visit  Return in about 6 months (around 12/6/2022) for Annual physical     Subjective:      Patient ID: Roxi Lagunas is a 64 y o  female  Chief Complaint   Patient presents with    Follow-up     [facility]         Patient reports that she has been feeling well  She recently became a grandmother  She has a lump on her left lower back that has been there for quite some time but she is concerned that it may be getting larger  It has been bothering her more        The following portions of the patient's history were reviewed and updated as appropriate:  past social history    Review of Systems      Current Outpatient Medications   Medication Sig Dispense Refill    EPINEPHrine (EPIPEN) 0 3 mg/0 3 mL SOAJ Inject as directed      losartan (COZAAR) 25 mg tablet TAKE 1 TABLET DAILY 90 tablet 1    Multiple Vitamins-Minerals (multivitamin with minerals) tablet Take 1 tablet by mouth daily      omeprazole (PriLOSEC) 40 MG capsule Take 1 capsule (40 mg total) by mouth daily 90 capsule 3    valACYclovir (VALTREX) 1,000 mg tablet Take 2 at onset of cold sore and repeat in 12 hours  20 tablet 3     No current facility-administered medications for this visit  Objective:    /80   Pulse 81   Temp (!) 97 3 °F (36 3 °C)   Resp 18   Ht 5' 5 25" (1 657 m)   Wt 79 3 kg (174 lb 12 8 oz)   SpO2 98%   BMI 28 87 kg/m²      Physical Exam  Vitals and nursing note reviewed  Constitutional:       Appearance: She is well-developed  HENT:      Head: Normocephalic and atraumatic  Right Ear: Tympanic membrane and external ear normal       Left Ear: Tympanic membrane and external ear normal    Cardiovascular:      Rate and Rhythm: Normal rate and regular rhythm  Heart sounds: Normal heart sounds  No murmur heard  No friction rub  Pulmonary:      Effort: Pulmonary effort is normal  No respiratory distress  Breath sounds: Normal breath sounds  No wheezing or rales  Musculoskeletal:      Right lower leg: No edema  Left lower leg: No edema     Skin:     Comments: Superficial skin lump left lower back             Eri Mayo DO

## 2022-06-23 ENCOUNTER — CONSULT (OUTPATIENT)
Dept: SURGERY | Facility: CLINIC | Age: 62
End: 2022-06-23
Payer: COMMERCIAL

## 2022-06-23 VITALS
BODY MASS INDEX: 29.16 KG/M2 | HEART RATE: 63 BPM | OXYGEN SATURATION: 99 % | DIASTOLIC BLOOD PRESSURE: 72 MMHG | TEMPERATURE: 96.4 F | WEIGHT: 175 LBS | SYSTOLIC BLOOD PRESSURE: 118 MMHG | HEIGHT: 65 IN

## 2022-06-23 DIAGNOSIS — D17.1 LIPOMA OF BACK: ICD-10-CM

## 2022-06-23 DIAGNOSIS — K45.8 LUMBAR HERNIA: Primary | ICD-10-CM

## 2022-06-23 PROCEDURE — 99204 OFFICE O/P NEW MOD 45 MIN: CPT | Performed by: SPECIALIST

## 2022-06-23 PROCEDURE — 3008F BODY MASS INDEX DOCD: CPT | Performed by: SPECIALIST

## 2022-06-23 PROCEDURE — 3074F SYST BP LT 130 MM HG: CPT | Performed by: SPECIALIST

## 2022-06-23 PROCEDURE — 3078F DIAST BP <80 MM HG: CPT | Performed by: SPECIALIST

## 2022-06-23 PROCEDURE — 1036F TOBACCO NON-USER: CPT | Performed by: SPECIALIST

## 2022-06-23 NOTE — PROGRESS NOTES
History and Physical Examination - General Surgery   [facility]  Milind Crews 64 y o  female MRN: [id number]  : [id number] PCP: Terrance Quinones DO    History of Present Illness   Chief Complaint:  Lump over left back which is increasing in size  Is softer on the outer side but I can feel now a lump inside which is hard and firm    HPI:  Milind Crews is a 64 y o  female who presents to office with history of swelling over the left back for many years which has lately increased in size and became more office  Denies any pain over the lump but had some pain on the left groin  Denies any nausea vomiting diarrhea constipation  History of GERD and on omeprazole    Colonoscopy was done which was unremarkable  Multiple drug allergies and patient carries EpiPen    Of vaccinated with COVID no history of COVID infection in past    Historical Information   The following portions of the patient's history were reviewed and updated as appropriate  Past Medical History:   Diagnosis Date    Baker's cyst, ruptured     Last assessed - 11/11/14    Closed fracture of metatarsal bone 01/13/2009    Exposure to rabies 06/19/2008    Hypertension     Precordial chest pain     Last assessed - 5/29/13     Past Surgical History:   Procedure Laterality Date    COLONOSCOPY      FOOT SURGERY Left     WRIST SURGERY      ganglion     Social History   Social History     Substance and Sexual Activity   Alcohol Use Yes    Comment: rare     Social History     Substance and Sexual Activity   Drug Use Never     Social History     Tobacco Use   Smoking Status Former Smoker    Types: Cigarettes   Smokeless Tobacco Never Used     Family History:   Family History   Problem Relation Age of Onset    Glaucoma Father     Hypertension Father     Hyperlipidemia Father     Kidney cancer Father     Colon cancer Paternal Aunt     Ovarian cancer Paternal Aunt     Hypertension Family     Hyperlipidemia Mother     Heart attack Mother     Cancer Paternal Grandmother     Cancer Paternal Grandfather     Esophageal cancer Paternal Uncle     Breast cancer Neg Hx     Diabetes Neg Hx     Mental illness Neg Hx        Meds/Allergies   Allergies   Allergen Reactions    Cephalexin Anaphylaxis    Other Hives     PRESERVATIVES        Current Outpatient Medications:     EPINEPHrine (EPIPEN) 0 3 mg/0 3 mL SOAJ, Inject as directed, Disp: , Rfl:     losartan (COZAAR) 25 mg tablet, TAKE 1 TABLET DAILY, Disp: 90 tablet, Rfl: 1    Multiple Vitamins-Minerals (multivitamin with minerals) tablet, Take 1 tablet by mouth daily, Disp: , Rfl:     omeprazole (PriLOSEC) 40 MG capsule, Take 1 capsule (40 mg total) by mouth daily, Disp: 90 capsule, Rfl: 3    valACYclovir (VALTREX) 1,000 mg tablet, Take 2 at onset of cold sore and repeat in 12 hours  , Disp: 20 tablet, Rfl: 3     REVIEW OF SYSTEMS  Constitutional:  Denies fever or chills   Eyes:  Denies change in visual acuity   HENT:  Denies nasal congestion or sore throat   Respiratory:  Denies cough or shortness of breath   Cardiovascular:  Denies chest pain or edema   GI:  Left lower quad abdominal pain, nausea, vomiting, bloody stools or diarrhea   :  Denies dysuria, frequency, difficulty in micturition and nocturia  Musculoskeletal:  Denies back pain or joint pain   Lump over the left side of the lumbar possible lipoma possible hernia  Neurologic:  Denies headache, focal weakness or sensory changes   Endocrine:  Denies polyuria or polydipsia   Lymphatic:  Denies swollen glands   Psychiatric:  Denies depression or anxiety     Objective   Current Vitals:   /72 (BP Location: Left arm, Patient Position: Sitting, Cuff Size: Large)   Pulse 63   Temp (!) 96 4 °F (35 8 °C) (Core)   Ht 5' 5 25" (1 657 m)   Wt 79 4 kg (175 lb)   SpO2 99%   BMI 28 90 kg/m²   Body mass index is 28 9 kg/m²       PHYSICAL EXAMS  General:  Patient is not in acute distress, laying in the bed comfortably, awake, alert responding to commands,   HEENT:  Both pupils normal-size atraumatic, normocephalic, nonicteric  Neck:  JVP not raised  Trachea central  Respiratory:  normal Breath sounds clear to auscultation,  Cardiovascular:  S1-S2 normal without any murmur   GI:  Abdomen soft   Musculoskeletal:  No back pain  Left lumbar area 15 cm by 8 cm soft swelling positive cough impulse  Non reducible you for your form mass underneath possible lipoma possible  Lumbar hernia  Integument:  No skin rashes or ulceration  Lymphatic:  No cervical or inguinal lymphadenopathy  Neurologic:  Patient is awake alert, responding to command, well-oriented to time and place and person moving all extremities ambulating well    Visit Diagnosis:   Diagnoses and all orders for this visit:    Lumbar hernia    Lipoma of back  -     Ambulatory referral to General Surgery       Plan of care was discussed with patient in detail    Pertinent labs reviewed  Pertinent images and available reads personally reviewed  No results found  Pertinent notes reviewed    Assessment/Plan   Assessment:  Large lipoma of the left side of the lumbar area  Possible lumbar hernia  Plan:  The risks, benefits, alternatives,and probabilities of success were discussed in detail with no guarantee made as to outcome  All questions were answered to the patient's satisfaction  CT scan of abdomin and pelvis  Follow-up in 2 weeks to schedule surgery    Counseling / Coordination of Care  Expained patient in detailed about coordination of care  A description of the counseling / coordination of care:  I performed an interim history, pertinent images and labs, performed a physical examination to arrive at the plan delineated above with associated thought processes  MD Marcell Patten    Office   Tel  [phone]  Fax   [phone]

## 2022-06-30 ENCOUNTER — HOSPITAL ENCOUNTER (OUTPATIENT)
Dept: RADIOLOGY | Facility: HOSPITAL | Age: 62
Discharge: HOME | End: 2022-06-30
Attending: SPECIALIST
Payer: COMMERCIAL

## 2022-06-30 DIAGNOSIS — K45.8 LUMBAR HERNIA: ICD-10-CM

## 2022-06-30 DIAGNOSIS — D17.1 LIPOMA OF BACK: ICD-10-CM

## 2022-06-30 PROCEDURE — 74176 CT ABD & PELVIS W/O CONTRAST: CPT

## 2022-07-08 ENCOUNTER — HOSPITAL ENCOUNTER (OUTPATIENT)
Dept: RADIOLOGY | Facility: HOSPITAL | Age: 62
Discharge: HOME | End: 2022-07-08
Payer: COMMERCIAL

## 2022-07-08 ENCOUNTER — OFFICE VISIT (OUTPATIENT)
Dept: SURGERY | Facility: CLINIC | Age: 62
End: 2022-07-08
Payer: COMMERCIAL

## 2022-07-08 VITALS
SYSTOLIC BLOOD PRESSURE: 120 MMHG | OXYGEN SATURATION: 100 % | HEIGHT: 65 IN | TEMPERATURE: 97.4 F | DIASTOLIC BLOOD PRESSURE: 70 MMHG | BODY MASS INDEX: 29.39 KG/M2 | HEART RATE: 69 BPM | WEIGHT: 176.4 LBS

## 2022-07-08 VITALS — WEIGHT: 176 LBS | HEIGHT: 65 IN | BODY MASS INDEX: 29.32 KG/M2

## 2022-07-08 DIAGNOSIS — D17.1 LIPOMA OF ABDOMINAL WALL: Primary | ICD-10-CM

## 2022-07-08 DIAGNOSIS — K42.9 UMBILICAL HERNIA WITHOUT OBSTRUCTION AND WITHOUT GANGRENE: ICD-10-CM

## 2022-07-08 DIAGNOSIS — Z12.31 ENCOUNTER FOR SCREENING MAMMOGRAM FOR BREAST CANCER: ICD-10-CM

## 2022-07-08 DIAGNOSIS — D17.1 LIPOMA OF BACK: ICD-10-CM

## 2022-07-08 DIAGNOSIS — K57.30 SIGMOID DIVERTICULOSIS: ICD-10-CM

## 2022-07-08 DIAGNOSIS — N20.0 RENAL CALCULI: ICD-10-CM

## 2022-07-08 DIAGNOSIS — M43.17 SPONDYLOLISTHESIS OF LUMBOSACRAL REGION: ICD-10-CM

## 2022-07-08 PROBLEM — K45.8 LUMBAR HERNIA: Status: RESOLVED | Noted: 2022-06-23 | Resolved: 2022-07-08

## 2022-07-08 PROCEDURE — 77067 SCR MAMMO BI INCL CAD: CPT

## 2022-07-08 PROCEDURE — 3078F DIAST BP <80 MM HG: CPT | Performed by: SPECIALIST

## 2022-07-08 PROCEDURE — 3074F SYST BP LT 130 MM HG: CPT | Performed by: SPECIALIST

## 2022-07-08 PROCEDURE — 99213 OFFICE O/P EST LOW 20 MIN: CPT | Performed by: SPECIALIST

## 2022-07-08 PROCEDURE — 77063 BREAST TOMOSYNTHESIS BI: CPT

## 2022-07-08 NOTE — ASSESSMENT & PLAN NOTE
Ambulating well no lower extremities neurological symptoms  To follow primary care physician for further management

## 2022-07-08 NOTE — PROGRESS NOTES
General Surgery Office Visit Follow up   [facility]  Patient: Pedro Rand   : 1960 Sex: female MRN: [id number]   CSN: [id number] PCP: Melinda Ramsay DO    Assessment/ Plan:  Pedro Rand is a 64 y o  female  day(s) follow-up after CT scan for lumbar deep lipoma rule out  Lipoma of abdominal wall [D17 1]    Plan  Ultrasound of the deep anterior abdominal wall and the lumbar area for lipoma    Patient wants to follow plastic surgeon for possible liposuction does not want any scar    CT scan reviewed not show any mass effect  Clinically patient has the mass, there was no epigastric hernia are no lumbar hernia on CT scan which was reviewed with patient    Other findings to follow with primary care physician    Follow-up in 2 weeks    SUBJECTIVE:   I am doing well  Has increased in size and has some pain    OBJECTIVE:  Multiple lipoma  No fever no chills no rigors  Tolerating p o  Diet well  Normal bowel movement no constipation or diarrhea  Ambulating well     Vitals:   /70 (BP Location: Right arm, Patient Position: Sitting, Cuff Size: Standard)   Pulse 69   Temp (!) 97 4 °F (36 3 °C) (Core)   Ht 5' 5 25" (1 657 m)   Wt 80 kg (176 lb 6 4 oz)   SpO2 100%   BMI 29 13 kg/m²     Active medications:    Current Outpatient Medications:     EPINEPHrine (EPIPEN) 0 3 mg/0 3 mL SOAJ, Inject as directed, Disp: , Rfl:     losartan (COZAAR) 25 mg tablet, TAKE 1 TABLET DAILY, Disp: 90 tablet, Rfl: 1    Multiple Vitamins-Minerals (multivitamin with minerals) tablet, Take 1 tablet by mouth daily, Disp: , Rfl:     omeprazole (PriLOSEC) 40 MG capsule, Take 1 capsule (40 mg total) by mouth daily, Disp: 90 capsule, Rfl: 3    valACYclovir (VALTREX) 1,000 mg tablet, Take 2 at onset of cold sore and repeat in 12 hours  , Disp: 20 tablet, Rfl: 3    Physical Exam:   General Alert awake   Normocephalic atraumatic PERRLA   Lungs clear bilaterally  Cardiac normal S1 normal S2  Abdomen soft,non tender Bowel sounds present  Epigastric area 4 x 3 cm soft to firm mass possible lipoma  Left lumbar 3 x 3 cm form deep situated intramuscular lipoma  Skin:  Moist  Ext: No clubbing, cyanosis, edema  Surgical wound well healed    Visit Diagnosis:   Diagnoses and all orders for this visit:    Lipoma of abdominal wall  -     US superficial lump (non extremity); Future    Lipoma of back  -     US superficial lump (non extremity); Future    Umbilical hernia without obstruction and without gangrene    Spondylolisthesis of lumbosacral region    Sigmoid diverticulosis    Renal calculi       Plan of care was discussed with patient in detail    Pertinent labs reviewed  Most Recent Labs:   No visits with results within 2 Week(s) from this visit  Latest known visit with results is:   Appointment on 11/26/2021   Component Date Value Ref Range Status    WBC 11/26/2021 5 34  4 31 - 10 16 Thousand/uL Final    RBC 11/26/2021 3 97  3 81 - 5 12 Million/uL Final    Hemoglobin 11/26/2021 11 7  11 5 - 15 4 g/dL Final    Hematocrit 11/26/2021 38 0  34 8 - 46 1 % Final    MCV 11/26/2021 96  82 - 98 fL Final    MCH 11/26/2021 29 5  26 8 - 34 3 pg Final    MCHC 11/26/2021 30 8 (A) 31 4 - 37 4 g/dL Final    RDW 11/26/2021 14 2  11 6 - 15 1 % Final    Platelets 27/52/9171 282  149 - 390 Thousands/uL Final    MPV 11/26/2021 10 5  8 9 - 12 7 fL Final    Sodium 11/26/2021 142  136 - 145 mmol/L Final    Potassium 11/26/2021 4 0  3 5 - 5 3 mmol/L Final    Chloride 11/26/2021 105  100 - 108 mmol/L Final    CO2 11/26/2021 29  21 - 32 mmol/L Final    ANION GAP 11/26/2021 8  4 - 13 mmol/L Final    BUN 11/26/2021 21  5 - 25 mg/dL Final    Creatinine 11/26/2021 0 91  0 60 - 1 30 mg/dL Final    Standardized to IDMS reference method    Glucose, Fasting 11/26/2021 98  65 - 99 mg/dL Final    Specimen collection should occur prior to Sulfasalazine administration due to the potential for falsely depressed results   Specimen collection should occur prior to Sulfapyridine administration due to the potential for falsely elevated results   Calcium 11/26/2021 8 8  8 3 - 10 1 mg/dL Final    AST 11/26/2021 20  5 - 45 U/L Final    Specimen collection should occur prior to Sulfasalazine administration due to the potential for falsely depressed results   ALT 11/26/2021 28  12 - 78 U/L Final    Specimen collection should occur prior to Sulfasalazine administration due to the potential for falsely depressed results   Alkaline Phosphatase 11/26/2021 94  46 - 116 U/L Final    Total Protein 11/26/2021 7 4  6 4 - 8 2 g/dL Final    Albumin 11/26/2021 3 7  3 5 - 5 0 g/dL Final    Total Bilirubin 11/26/2021 0 20  0 20 - 1 00 mg/dL Final    Use of this assay is not recommended for patients undergoing treatment with eltrombopag due to the potential for falsely elevated results   eGFR 11/26/2021 68  ml/min/1 73sq m Final    Free T4 11/26/2021 0 86  0 76 - 1 46 ng/dL Final    Specimen collection should occur prior to Sulfasalazine administration due to the potential for falsely elevated results   TSH 3RD GENERATON 11/26/2021 3 488  0 358 - 3 740 uIU/mL Final    The recommended reference ranges for TSH during pregnancy are as follows:   First trimester 0 1 to 2 5 uIU/mL   Second trimester  0 2 to 3 0 uIU/mL   Third trimester 0 3 to 3 0 uIU/m    Note: Normal ranges may not apply to patients who are transgender, non-binary, or whose legal sex, sex at birth, and gender identity differ      Cholesterol 11/26/2021 221 (A) See Comment mg/dL Final    Cholesterol:         Pediatric <18 Years        Desirable          <170 mg/dL      Borderline High    170-199 mg/dL      High               >=200 mg/dL        Adult >=18 Years            Desirable         <200 mg/dL      Borderline High   200-239 mg/dL      High              >239 mg/dL      Triglycerides 11/26/2021 138  See Comment mg/dL Final    Triglyceride:     0-9Y            <75mg/dL 10Y-17Y         <90 mg/dL       >=18Y     Normal          <150 mg/dL     Borderline High 150-199 mg/dL     High            200-499 mg/dL        Very High       >499 mg/dL    Specimen collection should occur prior to N-Acetylcysteine or Metamizole administration due to the potential for falsely depressed results   HDL, Direct 11/26/2021 66  >=50 mg/dL Final    Specimen collection should occur prior to Metamizole administration due to the potential for falsley depressed results   LDL Calculated 11/26/2021 127 (A) 0 - 100 mg/dL Final    LDL Cholesterol:     Optimal           <100 mg/dl     Near Optimal      100-129 mg/dl     Above Optimal       Borderline High 130-159 mg/dl       High            160-189 mg/dl       Very High       >189 mg/dl         This screening LDL is a calculated result  It does not have the accuracy of the Direct Measured LDL in the monitoring of patients with hyperlipidemia and/or statin therapy  Direct Measure LDL (HHQ773) must be ordered separately in these patients   THYROID MICROSOMAL ANTIBODY 11/26/2021 34  0 - 34 IU/mL Final    Thyroglobulin Ab 11/26/2021 8 2 (A) 0 0 - 0 9 IU/mL Final    Thyroglobulin Antibody measured by [employer]    THYROGLOBULIN (TG-GARY) 11/26/2021 103 (A) ng/mL Final    Confirmed by dilution  This test was developed and its  performance characteristics determined by [employer]  It has  not been cleared or approved by the Food and Drug  Administration  Reference Range:  Pubertal Children  and Adults: <40  According to the [facility] of Clinical Biochemistry,  the reference interval for Thyroglobulin (TG) should be  related to euthyroid patients and not for patients who  underwent thyroidectomy  TG reference intervals for these  patients depend on the residual mass of the thyroid tissue  left after surgery  Establishing a post-operative baseline  is recommended  The assay quantitation limit is 2 0 ng/mL         Pertinent images and available reads personally reviewed  Procedure: CT abdomen pelvis without contrast    Result Date: 7/4/2022  Narrative: CT ABDOMEN AND PELVIS WITHOUT IV CONTRAST INDICATION:  D17 1: Benign lipomatous neoplasm of skin and subcutaneous tissue of trunk  K45 8: Other specified abdominal hernia without obstruction or gangrene  COMPARISON:  CT abdomen and pelvis 11/7/2020 TECHNIQUE:  CT examination of the abdomen and pelvis was performed without intravenous contrast  This examination was performed without intravenous contrast in the context of the critical nationwide Omnipaque shortage  Axial, sagittal, and coronal 2D reformatted images were created from the source data and submitted for interpretation  Radiation dose length product (DLP) for this visit:  476 43 mGy-cm  This examination, like all CT scans performed in the [facility], was performed utilizing techniques to minimize radiation dose exposure, including the use of iterative reconstruction and automated exposure control  Enteric contrast was administered  FINDINGS: ABDOMEN LOWER CHEST:  No clinically significant abnormality identified in the visualized lower chest  LIVER/BILIARY TREE:  Unremarkable  GALLBLADDER:  No calcified gallstones  No pericholecystic inflammatory change  SPLEEN:  Unremarkable  PANCREAS:  Unremarkable  ADRENAL GLANDS:  Unremarkable  KIDNEYS/URETERS:  1-2 mm nonobstructing left upper pole calculus  No hydronephrosis  STOMACH AND BOWEL:  Limited evaluation of the bowel without IV contrast   Assessment of the stomach is limited by underdistention  No focal pathology seen within limitations  Small bowel is normal caliber  Sigmoid diverticulosis without evidence of diverticulitis  APPENDIX:  No findings to suggest appendicitis  ABDOMINOPELVIC CAVITY:  No ascites  No pneumoperitoneum  No enlarged lymphadenopathy within limitations of noncontrast imaging   VESSELS: Atherosclerotic changes abdominal aorta without evidence of aneurysm  PELVIS REPRODUCTIVE ORGANS:  Retroverted uterus  No adnexal masses  URINARY BLADDER:  Unremarkable  ABDOMINAL WALL/INGUINAL REGIONS:  Tiny fat-containing umbilical and supraumbilical ventral abdominal wall hernias  OSSEOUS STRUCTURES:  No acute fracture or destructive osseous lesion  Multilevel degenerative changes of the spine  Grade 1 spondylolisthesis L4 on L5 secondary to facet arthropathy  Moderate central canal narrowing at this level which in large part is related to hypertrophy of the ligamentum flavum  Impression: No acute intra-abdominal abnormality  Sigmoid diverticulosis without evidence of diverticulitis  Tiny nonobstructing upper pole left renal calculus  Tiny fat-containing umbilical and supraumbilical ventral abdominal wall hernias  Limited study without IV contrast  Additional incidental findings as above  Workstation performed: [id number]         Pertinent notes reviewed    Counseling / Coordination of Care  Total Office time /unit time spent today 15minutes  Greater than 50% of total time was spent with the patient and / or family counseling and / or coordination of care  A description of the counseling / coordination of care:  I performed an interim history, pertinent images and labs, performed a physical examination to arrive at the plan delineated above with associated thought processes  Family member/primary contact updated     Jerod Chavez MD MS FRCS FACS  [address] [place] [address] [facility]  07/08/22 12:20 PM        Portions of the record may have been created with voice recognition software  Occasional wrong word or "sound a like" substitutions may have occurred due to the inherent limitations of voice recognition software  Read the chart carefully and recognize, using context, where substitutions have occurred

## 2022-07-13 DIAGNOSIS — I10 BENIGN ESSENTIAL HYPERTENSION: ICD-10-CM

## 2022-07-13 RX ORDER — LOSARTAN POTASSIUM 25 MG/1
TABLET ORAL
Qty: 90 TABLET | Refills: 1 | Status: SHIPPED | OUTPATIENT
Start: 2022-07-13

## 2022-08-19 DIAGNOSIS — B00.9 HERPES SIMPLEX: ICD-10-CM

## 2022-08-19 RX ORDER — VALACYCLOVIR HYDROCHLORIDE 1 G/1
TABLET, FILM COATED ORAL
Qty: 20 TABLET | Refills: 3 | Status: SHIPPED | OUTPATIENT
Start: 2022-08-19 | End: 2023-12-12

## 2022-09-06 ENCOUNTER — OFFICE VISIT (OUTPATIENT)
Dept: FAMILY MEDICINE CLINIC | Facility: CLINIC | Age: 62
End: 2022-09-06
Payer: COMMERCIAL

## 2022-09-06 VITALS
BODY MASS INDEX: 29.06 KG/M2 | SYSTOLIC BLOOD PRESSURE: 126 MMHG | WEIGHT: 174.4 LBS | HEART RATE: 82 BPM | HEIGHT: 65 IN | OXYGEN SATURATION: 100 % | DIASTOLIC BLOOD PRESSURE: 78 MMHG | RESPIRATION RATE: 18 BRPM | TEMPERATURE: 97.6 F

## 2022-09-06 DIAGNOSIS — T78.40XD ALLERGIC REACTION, SUBSEQUENT ENCOUNTER: ICD-10-CM

## 2022-09-06 DIAGNOSIS — J06.9 ACUTE URI: Primary | ICD-10-CM

## 2022-09-06 PROCEDURE — 3074F SYST BP LT 130 MM HG: CPT | Performed by: FAMILY MEDICINE

## 2022-09-06 PROCEDURE — 99213 OFFICE O/P EST LOW 20 MIN: CPT | Performed by: FAMILY MEDICINE

## 2022-09-06 PROCEDURE — 3078F DIAST BP <80 MM HG: CPT | Performed by: FAMILY MEDICINE

## 2022-09-06 RX ORDER — AZITHROMYCIN 250 MG/1
TABLET, FILM COATED ORAL
Qty: 6 TABLET | Refills: 0 | Status: SHIPPED | OUTPATIENT
Start: 2022-09-06 | End: 2022-09-11

## 2022-09-06 RX ORDER — EPINEPHRINE 0.3 MG/.3ML
0.3 INJECTION SUBCUTANEOUS ONCE
Qty: 0.6 ML | Refills: 0 | Status: SHIPPED | OUTPATIENT
Start: 2022-09-06 | End: 2022-09-06

## 2022-09-06 NOTE — PROGRESS NOTES
Assessment/Plan:       Diagnoses and all orders for this visit:    Acute URI  -     azithromycin (Zithromax) 250 mg tablet; Take 2 tablets (500 mg total) by mouth daily for 1 day, THEN 1 tablet (250 mg total) daily for 4 days  - Counseled on supportive care  Allergic reaction, subsequent encounter  -     EPINEPHrine (EPIPEN) 0 3 mg/0 3 mL SOAJ; Inject 0 3 mL (0 3 mg total) into a muscle once for 1 dose        Subjective:      Patient ID: Vickie Mac is a 64 y o  female  Sinusitis  This is a new problem  Episode onset: 1 week ago  The problem has been gradually worsening since onset  There has been no fever  Associated symptoms include congestion, coughing, headaches, a hoarse voice, sinus pressure, sneezing and a sore throat  Pertinent negatives include no chills, diaphoresis, ear pain, neck pain, shortness of breath or swollen glands  Past treatments include oral decongestants  The treatment provided no relief  Took rapid and PCR COVID-19 tests which were both negative  The following portions of the patient's history were reviewed and updated as appropriate: allergies, current medications, past family history, past medical history, past social history, past surgical history and problem list     Review of Systems   Constitutional: Negative for chills and diaphoresis  HENT: Positive for congestion, hoarse voice, sinus pressure, sneezing and sore throat  Negative for ear pain  Respiratory: Positive for cough  Negative for shortness of breath  Musculoskeletal: Negative for neck pain  Neurological: Positive for headaches  Objective:      /78   Pulse 82   Temp 97 6 °F (36 4 °C)   Resp 18   Ht 5' 5 25" (1 657 m)   Wt 79 1 kg (174 lb 6 4 oz)   SpO2 100%   BMI 28 80 kg/m²          Physical Exam  Constitutional:       General: She is not in acute distress  Appearance: She is well-developed  She is not diaphoretic  HENT:      Head: Normocephalic and atraumatic  Right Ear: Tympanic membrane, ear canal and external ear normal  There is no impacted cerumen  Left Ear: Tympanic membrane, ear canal and external ear normal  There is no impacted cerumen  Nose: Nose normal  No congestion or rhinorrhea  Mouth/Throat:      Mouth: Mucous membranes are moist       Pharynx: Oropharynx is clear  No oropharyngeal exudate or posterior oropharyngeal erythema  Eyes:      General: No scleral icterus  Right eye: No discharge  Left eye: No discharge  Conjunctiva/sclera: Conjunctivae normal    Cardiovascular:      Rate and Rhythm: Normal rate and regular rhythm  Heart sounds: Normal heart sounds  No murmur heard  No friction rub  No gallop  Pulmonary:      Effort: Pulmonary effort is normal  No respiratory distress  Breath sounds: Normal breath sounds  No wheezing or rales  Chest:      Chest wall: No tenderness  Musculoskeletal:         General: No deformity  Normal range of motion  Cervical back: Normal range of motion and neck supple  Skin:     General: Skin is warm and dry  Neurological:      Mental Status: She is alert and oriented to person, place, and time  Psychiatric:         Behavior: Behavior normal          Thought Content:  Thought content normal          Judgment: Judgment normal

## 2022-09-07 ENCOUNTER — PATIENT MESSAGE (OUTPATIENT)
Dept: FAMILY MEDICINE CLINIC | Facility: CLINIC | Age: 62
End: 2022-09-07

## 2022-09-07 DIAGNOSIS — J06.9 ACUTE URI: Primary | ICD-10-CM

## 2022-09-07 RX ORDER — BENZONATATE 200 MG/1
200 CAPSULE ORAL 3 TIMES DAILY PRN
Qty: 20 CAPSULE | Refills: 0 | Status: SHIPPED | OUTPATIENT
Start: 2022-09-07

## 2022-09-07 RX ORDER — BENZONATATE 200 MG/1
200 CAPSULE ORAL 3 TIMES DAILY PRN
Qty: 20 CAPSULE | Refills: 0 | Status: SHIPPED | OUTPATIENT
Start: 2022-09-07 | End: 2022-09-07 | Stop reason: SDUPTHER

## 2022-10-22 DIAGNOSIS — K21.9 GERD WITHOUT ESOPHAGITIS: ICD-10-CM

## 2022-10-24 RX ORDER — OMEPRAZOLE 40 MG/1
CAPSULE, DELAYED RELEASE ORAL
Qty: 90 CAPSULE | Refills: 0 | Status: SHIPPED | OUTPATIENT
Start: 2022-10-24

## 2022-12-21 ENCOUNTER — TELEPHONE (OUTPATIENT)
Dept: FAMILY MEDICINE CLINIC | Facility: CLINIC | Age: 62
End: 2022-12-21

## 2022-12-21 ENCOUNTER — TELEPHONE (OUTPATIENT)
Dept: ENDOCRINOLOGY | Facility: CLINIC | Age: 62
End: 2022-12-21

## 2022-12-21 DIAGNOSIS — E06.3 HASHIMOTO'S THYROIDITIS: Primary | ICD-10-CM

## 2022-12-21 NOTE — TELEPHONE ENCOUNTER
New order for an external referral to [facility] done  Please fax as requested and let her know when it was done  Thank you,  Jono Macdonald, DO

## 2022-12-21 NOTE — TELEPHONE ENCOUNTER
Faxed referral to [facility] at [phone]  Awaiting confirmation

## 2022-12-21 NOTE — TELEPHONE ENCOUNTER
Fax confirmation received  Called and spoke with patient and she is aware order was sent to [facility]      No further action needed

## 2022-12-21 NOTE — TELEPHONE ENCOUNTER
----- Message from Tabatha Leos, [address] [place] sent at 12/20/2022 11:04 AM EST -----  Regarding: FW: Referral  Contact: [phone]  Please advise  Tabatha Leos    ----- Message -----  From: Shi Singh  Sent: 12/20/2022  11:02 AM EST  To: THE [facility]  Subject: Referral                                         Hi Dr Savanah Krishna,    I left a message with the referral hotline a couple of weeks ago for a referral to the [facility], which they require for new patients  I called last week and they had not received a referral from [facility]  Can please ensure a referral is sent? I would like to follow up regarding my Hashimoto Thyroiditis diagnosis  Thank you and Happy Holidays!   Declan Pham

## 2022-12-29 ENCOUNTER — OFFICE VISIT (OUTPATIENT)
Dept: FAMILY MEDICINE CLINIC | Facility: CLINIC | Age: 62
End: 2022-12-29

## 2022-12-29 ENCOUNTER — APPOINTMENT (OUTPATIENT)
Dept: RADIOLOGY | Facility: CLINIC | Age: 62
End: 2022-12-29

## 2022-12-29 VITALS
BODY MASS INDEX: 29.06 KG/M2 | TEMPERATURE: 99 F | OXYGEN SATURATION: 98 % | RESPIRATION RATE: 18 BRPM | DIASTOLIC BLOOD PRESSURE: 96 MMHG | SYSTOLIC BLOOD PRESSURE: 154 MMHG | WEIGHT: 176 LBS | HEART RATE: 75 BPM

## 2022-12-29 DIAGNOSIS — M25.561 ACUTE PAIN OF BOTH KNEES: ICD-10-CM

## 2022-12-29 DIAGNOSIS — M25.562 ACUTE PAIN OF BOTH KNEES: ICD-10-CM

## 2022-12-29 DIAGNOSIS — I10 BENIGN ESSENTIAL HYPERTENSION: Primary | ICD-10-CM

## 2022-12-29 RX ORDER — BIMATOPROST 0.01 %
DROPS OPHTHALMIC (EYE)
COMMUNITY
Start: 2022-11-04

## 2022-12-29 RX ORDER — LOSARTAN POTASSIUM 50 MG/1
50 TABLET ORAL DAILY
Qty: 90 TABLET | Refills: 1
Start: 2022-12-29

## 2022-12-29 NOTE — PROGRESS NOTES
Assessment/Plan:    She did not take her Losartan this morning, BP elevated today  Will have her increase to 50mg daily  She will check with pharmacy about switching generic  Bilat knee XR ordered  F/u with PCP     1  Benign essential hypertension  -     losartan (COZAAR) 50 mg tablet; Take 1 tablet (50 mg total) by mouth daily    2  Acute pain of both knees  -     XR knee 3 vw right non injury; Future; Expected date: 12/29/2022  -     XR knee 3 vw left non injury; Future; Expected date: 12/29/2022          There are no Patient Instructions on file for this visit  Return if symptoms worsen or fail to improve  Subjective:      Patient ID: Paola Noyola is a 58 y o  female  Chief Complaint   Patient presents with   • Blood Pressure Check   • Cyst     Left leg, bakers cyst        sas  cma       Generic Losartan was changed about 1-2 months ago  She started checking her BP last week and DBP was in the 90s  SBP has been running 150s  She did not take her medication this morning  The backs of her knees hurt  They hurt with prolonged activity  No obvious swelling  The following portions of the patient's history were reviewed and updated as appropriate: allergies, current medications, past family history, past medical history, past social history, past surgical history and problem list     Review of Systems   Constitutional: Negative for chills, fatigue and fever  Respiratory: Negative for cough, shortness of breath and wheezing  Cardiovascular: Negative for chest pain, palpitations and leg swelling  Gastrointestinal: Negative for abdominal pain, diarrhea, nausea and vomiting  Musculoskeletal:        See HPI   Skin: Negative for rash  Neurological: Negative for dizziness and headaches           Current Outpatient Medications   Medication Sig Dispense Refill   • EPINEPHrine (EPIPEN) 0 3 mg/0 3 mL SOAJ Inject 0 3 mL (0 3 mg total) into a muscle once for 1 dose 0 6 mL 0   • losartan (COZAAR) 50 mg tablet Take 1 tablet (50 mg total) by mouth daily 90 tablet 1   • Lumigan 0 01 % ophthalmic drops      • Multiple Vitamins-Minerals (multivitamin with minerals) tablet Take 1 tablet by mouth daily     • omeprazole (PriLOSEC) 40 MG capsule TAKE 1 CAPSULE DAILY 90 capsule 0   • valACYclovir (VALTREX) 1,000 mg tablet Take 2 at onset of cold sore and repeat in 12 hours  20 tablet 3     No current facility-administered medications for this visit  Objective:    /96   Pulse 75   Temp 99 °F (37 2 °C)   Resp 18   Wt 79 8 kg (176 lb)   SpO2 98%   BMI 29 06 kg/m²        Physical Exam  Vitals and nursing note reviewed  Constitutional:       Appearance: She is well-developed  Cardiovascular:      Rate and Rhythm: Normal rate and regular rhythm  Heart sounds: Normal heart sounds  No murmur heard  Comments: Negative Lauren's sign bilaterally  Pulmonary:      Effort: Pulmonary effort is normal       Breath sounds: Normal breath sounds  Musculoskeletal:      Right lower leg: No swelling  No edema  Left lower leg: No swelling  No edema  Comments: Bilateral popliteal fossa TTP   Skin:     General: Skin is warm and dry  Neurological:      Mental Status: She is alert                  Chapito Rivers

## 2023-01-04 DIAGNOSIS — K21.9 GERD WITHOUT ESOPHAGITIS: ICD-10-CM

## 2023-01-05 RX ORDER — OMEPRAZOLE 40 MG/1
CAPSULE, DELAYED RELEASE ORAL
Qty: 90 CAPSULE | Refills: 0 | Status: SHIPPED | OUTPATIENT
Start: 2023-01-05

## 2023-01-16 ENCOUNTER — APPOINTMENT (OUTPATIENT)
Dept: LAB | Facility: CLINIC | Age: 63
End: 2023-01-16

## 2023-01-16 DIAGNOSIS — E06.3 HASHIMOTO'S THYROIDITIS: ICD-10-CM

## 2023-01-16 DIAGNOSIS — I10 BENIGN ESSENTIAL HYPERTENSION: ICD-10-CM

## 2023-01-16 LAB
ALBUMIN SERPL BCP-MCNC: 3.7 G/DL (ref 3.5–5)
ALP SERPL-CCNC: 110 U/L (ref 46–116)
ALT SERPL W P-5'-P-CCNC: 19 U/L (ref 12–78)
ANION GAP SERPL CALCULATED.3IONS-SCNC: 4 MMOL/L (ref 4–13)
AST SERPL W P-5'-P-CCNC: 15 U/L (ref 5–45)
BILIRUB SERPL-MCNC: 0.4 MG/DL (ref 0.2–1)
BUN SERPL-MCNC: 16 MG/DL (ref 5–25)
CALCIUM SERPL-MCNC: 9.4 MG/DL (ref 8.3–10.1)
CHLORIDE SERPL-SCNC: 106 MMOL/L (ref 96–108)
CHOLEST SERPL-MCNC: 205 MG/DL
CO2 SERPL-SCNC: 29 MMOL/L (ref 21–32)
CREAT SERPL-MCNC: 0.86 MG/DL (ref 0.6–1.3)
ERYTHROCYTE [DISTWIDTH] IN BLOOD BY AUTOMATED COUNT: 13.8 % (ref 11.6–15.1)
GFR SERPL CREATININE-BSD FRML MDRD: 72 ML/MIN/1.73SQ M
GLUCOSE P FAST SERPL-MCNC: 89 MG/DL (ref 65–99)
HCT VFR BLD AUTO: 40.3 % (ref 34.8–46.1)
HDLC SERPL-MCNC: 60 MG/DL
HGB BLD-MCNC: 12.6 G/DL (ref 11.5–15.4)
LDLC SERPL CALC-MCNC: 120 MG/DL (ref 0–100)
MCH RBC QN AUTO: 29.1 PG (ref 26.8–34.3)
MCHC RBC AUTO-ENTMCNC: 31.3 G/DL (ref 31.4–37.4)
MCV RBC AUTO: 93 FL (ref 82–98)
PLATELET # BLD AUTO: 312 THOUSANDS/UL (ref 149–390)
PMV BLD AUTO: 11.2 FL (ref 8.9–12.7)
POTASSIUM SERPL-SCNC: 4 MMOL/L (ref 3.5–5.3)
PROT SERPL-MCNC: 7.4 G/DL (ref 6.4–8.4)
RBC # BLD AUTO: 4.33 MILLION/UL (ref 3.81–5.12)
SODIUM SERPL-SCNC: 139 MMOL/L (ref 135–147)
T4 FREE SERPL-MCNC: 0.87 NG/DL (ref 0.76–1.46)
TRIGL SERPL-MCNC: 125 MG/DL
TSH SERPL DL<=0.05 MIU/L-ACNC: 6.44 UIU/ML (ref 0.45–4.5)
WBC # BLD AUTO: 5.66 THOUSAND/UL (ref 4.31–10.16)

## 2023-01-17 ENCOUNTER — TELEPHONE (OUTPATIENT)
Dept: FAMILY MEDICINE CLINIC | Facility: CLINIC | Age: 63
End: 2023-01-17

## 2023-01-17 DIAGNOSIS — E06.3 HASHIMOTO'S THYROIDITIS: Primary | ICD-10-CM

## 2023-01-17 NOTE — TELEPHONE ENCOUNTER
1/17/2023 12:40 PM spoke with Johanna Jade regarding her abnormal thyroid levels  She is going to set up an appointment to discuss options  She is also noticed that it feels like her thyroid gland may be getting enlarged  Feels like it presses when she swallows    Thyroid ultrasound ordered and she is going to call the office to set up a physical     Message francisco Colbert DO

## 2023-01-19 ENCOUNTER — HOSPITAL ENCOUNTER (OUTPATIENT)
Dept: RADIOLOGY | Facility: HOSPITAL | Age: 63
Discharge: HOME | End: 2023-01-19

## 2023-01-19 DIAGNOSIS — E06.3 HASHIMOTO'S THYROIDITIS: ICD-10-CM

## 2023-01-27 ENCOUNTER — HOSPITAL ENCOUNTER (OUTPATIENT)
Dept: RADIOLOGY | Facility: HOSPITAL | Age: 63
Discharge: HOME | End: 2023-01-27
Attending: SPECIALIST

## 2023-01-27 ENCOUNTER — OFFICE VISIT (OUTPATIENT)
Dept: FAMILY MEDICINE CLINIC | Facility: CLINIC | Age: 63
End: 2023-01-27

## 2023-01-27 VITALS
HEART RATE: 76 BPM | RESPIRATION RATE: 18 BRPM | DIASTOLIC BLOOD PRESSURE: 86 MMHG | TEMPERATURE: 97.5 F | SYSTOLIC BLOOD PRESSURE: 140 MMHG | WEIGHT: 177.8 LBS | BODY MASS INDEX: 29.62 KG/M2 | HEIGHT: 65 IN

## 2023-01-27 DIAGNOSIS — I10 BENIGN ESSENTIAL HYPERTENSION: ICD-10-CM

## 2023-01-27 DIAGNOSIS — D17.1 LIPOMA OF ABDOMINAL WALL: ICD-10-CM

## 2023-01-27 DIAGNOSIS — E03.9 ACQUIRED HYPOTHYROIDISM: Primary | ICD-10-CM

## 2023-01-27 DIAGNOSIS — K21.9 GERD WITHOUT ESOPHAGITIS: ICD-10-CM

## 2023-01-27 DIAGNOSIS — D17.1 LIPOMA OF BACK: ICD-10-CM

## 2023-01-27 PROBLEM — E04.2 MULTIPLE THYROID NODULES: Status: ACTIVE | Noted: 2023-01-27

## 2023-01-27 RX ORDER — LOSARTAN POTASSIUM 100 MG/1
100 TABLET ORAL DAILY
Qty: 90 TABLET | Refills: 1 | Status: SHIPPED | OUTPATIENT
Start: 2023-01-27

## 2023-01-27 RX ORDER — LEVOTHYROXINE SODIUM 0.03 MG/1
25 TABLET ORAL
Qty: 90 TABLET | Refills: 1 | Status: SHIPPED | OUTPATIENT
Start: 2023-01-27

## 2023-01-27 NOTE — PROGRESS NOTES
Name: Melinda Bains      : 1960      MRN: [id number]  Encounter Provider: Jennifer Hart DO  Encounter Date: 2023   Encounter department: [address] [place] [address]     1  Acquired hypothyroidism  Assessment & Plan:  Newly diagnosed  Risk benefits of medication discussed  Levothyroxine 25 mcg daily started    Orders:  -     levothyroxine (Euthyrox) 25 mcg tablet; Take 1 tablet (25 mcg total) by mouth daily in the early morning  -     T4, free; Future  -     TSH, 3rd generation; Future    2  Benign essential hypertension  Assessment & Plan:  Blood pressure still not at goal  Dose of losartan increased from 50 mg to 100 mg daily    Orders:  -     losartan (COZAAR) 100 MG tablet; Take 1 tablet (100 mg total) by mouth daily    3  GERD without esophagitis  Assessment & Plan:  Advised to follow up with GI regarding difficulty swallowing pills  Depression Screening and Follow-up Plan: Patient was screened for depression during today's encounter  They screened negative with a PHQ-2 score of 0  Return in about 2 months (around 3/27/2023) for Next scheduled follow up  Subjective      Patient reports that she has been feeling okay  She does have issues sometimes swallowing with vitamins getting stuck in her throat  Review of Systems    Current Outpatient Medications on File Prior to Visit   Medication Sig   • EPINEPHrine (EPIPEN) 0 3 mg/0 3 mL SOAJ Inject 0 3 mL (0 3 mg total) into a muscle once for 1 dose   • Lumigan 0 01 % ophthalmic drops    • Multiple Vitamins-Minerals (multivitamin with minerals) tablet Take 1 tablet by mouth daily   • omeprazole (PriLOSEC) 40 MG capsule TAKE 1 CAPSULE DAILY   • valACYclovir (VALTREX) 1,000 mg tablet Take 2 at onset of cold sore and repeat in 12 hours     • [DISCONTINUED] losartan (COZAAR) 50 mg tablet Take 1 tablet (50 mg total) by mouth daily       Objective     /86   Pulse 76   Temp 97 5 °F (36 4 °C)   Resp 18   Ht 5' 5 25" (1 657 m)   Wt 80 6 kg (177 lb 12 8 oz)   BMI 29 36 kg/m²     Physical Exam  Vitals and nursing note reviewed  Constitutional:       Appearance: She is well-developed  HENT:      Head: Normocephalic and atraumatic  Right Ear: Tympanic membrane and external ear normal       Left Ear: Tympanic membrane and external ear normal    Neck:      Comments: Bilateral thyroid enlargement  Cardiovascular:      Rate and Rhythm: Normal rate and regular rhythm  Heart sounds: Normal heart sounds  No murmur heard  No friction rub  Pulmonary:      Effort: Pulmonary effort is normal  No respiratory distress  Breath sounds: Normal breath sounds  No wheezing or rales  Musculoskeletal:      Right lower leg: No edema  Left lower leg: No edema         Xena Poplar, DO

## 2023-02-10 DIAGNOSIS — K21.9 GERD WITHOUT ESOPHAGITIS: ICD-10-CM

## 2023-02-10 RX ORDER — OMEPRAZOLE 40 MG/1
CAPSULE, DELAYED RELEASE ORAL
Qty: 90 CAPSULE | Refills: 0 | Status: SHIPPED | OUTPATIENT
Start: 2023-02-10

## 2023-03-28 ENCOUNTER — RA CDI HCC (OUTPATIENT)
Dept: OTHER | Facility: HOSPITAL | Age: 63
End: 2023-03-28

## 2023-03-28 NOTE — PROGRESS NOTES
[facility] 75  coding opportunities       Chart reviewed, no opportunity found: CHART REVIEWED, NO OPPORTUNITY FOUND        Patients Insurance        Commercial Insurance: [employer]

## 2023-03-31 ENCOUNTER — APPOINTMENT (OUTPATIENT)
Dept: RADIOLOGY | Facility: CLINIC | Age: 63
End: 2023-03-31

## 2023-03-31 ENCOUNTER — OFFICE VISIT (OUTPATIENT)
Dept: FAMILY MEDICINE CLINIC | Facility: CLINIC | Age: 63
End: 2023-03-31

## 2023-03-31 VITALS
BODY MASS INDEX: 29.23 KG/M2 | WEIGHT: 177 LBS | DIASTOLIC BLOOD PRESSURE: 78 MMHG | RESPIRATION RATE: 16 BRPM | SYSTOLIC BLOOD PRESSURE: 118 MMHG | TEMPERATURE: 99 F | HEART RATE: 72 BPM

## 2023-03-31 DIAGNOSIS — G89.29 CHRONIC LEFT SHOULDER PAIN: ICD-10-CM

## 2023-03-31 DIAGNOSIS — M25.512 CHRONIC LEFT SHOULDER PAIN: ICD-10-CM

## 2023-03-31 DIAGNOSIS — I10 BENIGN ESSENTIAL HYPERTENSION: Primary | ICD-10-CM

## 2023-03-31 DIAGNOSIS — E03.8 HYPOTHYROIDISM DUE TO HASHIMOTO'S THYROIDITIS: ICD-10-CM

## 2023-03-31 DIAGNOSIS — E06.3 HYPOTHYROIDISM DUE TO HASHIMOTO'S THYROIDITIS: ICD-10-CM

## 2023-03-31 RX ORDER — LOSARTAN POTASSIUM 50 MG
50 TABLET ORAL DAILY
Qty: 30 TABLET | Refills: 3 | Status: SHIPPED | OUTPATIENT
Start: 2023-03-31

## 2023-03-31 NOTE — PROGRESS NOTES
Name: Mela Gonzalez      : 1960      MRN: [id number]  Encounter Provider: Richmond Matute DO  Encounter Date: 3/31/2023   Encounter department: [address] [place] [address]     1  Benign essential hypertension  Assessment & Plan:  She is suspicious that the generic losartan was no longer working would like to try the name brand  Her dose of medication had to go from 25 to 100 mg and a quick time  Patient changed to name brand Cozaar 50 mg daily  She will monitor her blood pressure at home    Orders:  -     Cozaar 50 MG tablet; Take 1 tablet (50 mg total) by mouth daily Unable to tolerate generic, not effective  -     CBC; Future; Expected date: 2023  -     Comprehensive metabolic panel; Future; Expected date: 2023  -     Lipid Panel with Direct LDL reflex; Future; Expected date: 2023    2  Hypothyroidism due to Hashimoto's thyroiditis  Assessment & Plan:  Now being managed by endocrinology      3  Chronic left shoulder pain  Comments:  worsening  Orders:  -     Ambulatory referral to Physical Therapy; Future  -     XR shoulder 2+ vw left; Future; Expected date: 2023        Return in about 3 months (around 2023) for Annual physical       Subjective      She did see the endocrinologist to get opinion about her hashimoto's  She has been having an ongoing issue with pain in her left shoulder  It has been going on for over 3 months  She will get burning pain inferior of her deltoid muscle  She also significant pain with reaching above her shoulder      Review of Systems    Current Outpatient Medications on File Prior to Visit   Medication Sig   • EPINEPHrine (EPIPEN) 0 3 mg/0 3 mL SOAJ Inject 0 3 mL (0 3 mg total) into a muscle once for 1 dose   • Lumigan 0 01 % ophthalmic drops    • Multiple Vitamins-Minerals (multivitamin with minerals) tablet Take 1 tablet by mouth daily   • omeprazole (PriLOSEC) 40 MG capsule TAKE 1 CAPSULE DAILY   • valACYclovir (VALTREX) 1,000 mg tablet Take 2 at onset of cold sore and repeat in 12 hours  • [DISCONTINUED] losartan (COZAAR) 100 MG tablet Take 1 tablet (100 mg total) by mouth daily   • levothyroxine (Euthyrox) 25 mcg tablet Take 1 tablet (25 mcg total) by mouth daily in the early morning       Objective     /78   Pulse 72   Temp 99 °F (37 2 °C)   Resp 16   Wt 80 3 kg (177 lb)   BMI 29 23 kg/m²     Physical Exam  Vitals and nursing note reviewed  Constitutional:       Appearance: She is well-developed  HENT:      Head: Normocephalic and atraumatic  Right Ear: Tympanic membrane and external ear normal       Left Ear: Tympanic membrane and external ear normal    Cardiovascular:      Rate and Rhythm: Normal rate and regular rhythm  Heart sounds: Normal heart sounds  No murmur heard  No friction rub  Pulmonary:      Effort: Pulmonary effort is normal  No respiratory distress  Breath sounds: Normal breath sounds  No wheezing or rales  Musculoskeletal:         General: Tenderness ( Inferior of left deltoid) present  Right lower leg: No edema  Left lower leg: No edema         Riana Voss DO

## 2023-03-31 NOTE — ASSESSMENT & PLAN NOTE
She is suspicious that the generic losartan was no longer working would like to try the name brand  Her dose of medication had to go from 25 to 100 mg and a quick time  Patient changed to name brand Cozaar 50 mg daily    She will monitor her blood pressure at home

## 2023-04-21 ENCOUNTER — EVALUATION (OUTPATIENT)
Dept: PHYSICAL THERAPY | Facility: CLINIC | Age: 63
End: 2023-04-21

## 2023-04-21 DIAGNOSIS — G89.29 CHRONIC LEFT SHOULDER PAIN: ICD-10-CM

## 2023-04-21 DIAGNOSIS — M25.512 CHRONIC LEFT SHOULDER PAIN: ICD-10-CM

## 2023-04-21 NOTE — PROGRESS NOTES
PT Evaluation     Today's date: 2023  Patient name: Pratibha Zarco  : 1960  MRN: [id number]  Referring provider: Dorian Odell DO  Dx:   Encounter Diagnosis     ICD-10-CM    1  Chronic left shoulder pain  M25 512 Ambulatory referral to Physical Therapy    G89 29     worsening                     Assessment  Assessment details: Kojo Leyvaickpresents with signs and symptoms consistent with Chronic left shoulder pain, with loss of range of motion, strength and spinal stabilization  Presents with high reactivity  Pratibha Zarco would benefit with physical therapy to address these impairments to return to prior level of function  Impairments: abnormal or restricted ROM, activity intolerance, impaired physical strength, lacks appropriate home exercise program, pain with function and poor posture     Goals  STG  Initiate HEP  Decrease pain by 50% in 3 weeks  Patient performing HEP 50% of time in 3 weeks  LTG  Independent with HEP  Decrease pain by 90% in 6 weeks  Patient performing HEP 90% of time in 6 weeks  FOTO > 62    in 6 weeks    Plan  Planned therapy interventions: joint mobilization, manual therapy, neuromuscular re-education, patient education, strengthening, stretching, therapeutic exercise and home exercise program  Frequency: 2x week  Duration in visits: 12  Duration in weeks: 6  Treatment plan discussed with: patient        Subjective Evaluation    History of Present Illness  Mechanism of injury: Patient reports 2-3 months of left shoulder pain sudden onset, disturbed sleep, limited, denies injury  Went to her PCP, 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              Recurrent probem    Quality of life: good    Pain  Current pain ratin  At best pain ratin  At worst pain ratin  Location: left shoulder  Quality: dull ache and needle-like  Relieving factors: change in position and rest  Aggravating factors: lifting and overhead activity    Treatments  Current treatment: physical therapy  Patient Goals  Patient goals for therapy: decreased pain, improved balance, increased motion, increased strength, independence with ADLs/IADLs and return to sport/leisure activities          Objective     Postural Observations  Seated posture: fair  Standing posture: fair  Correction of posture: makes symptoms better        Active Range of Motion   Left Shoulder   Flexion: 165 degrees with pain  Abduction: 160 degrees with pain  External rotation 0°: 65 degrees with pain    Right Shoulder   Flexion: 180 degrees   Abduction: 180 degrees   External rotation 0°: 80 degrees     Strength/Myotome Testing     Left Shoulder     Planes of Motion   Flexion: 3+   Abduction: 3+   External rotation at 0°: 3+     Right Shoulder     Planes of Motion   Flexion: 5   Abduction: 5   External rotation at 0°: 5       Flowsheet Rows    Flowsheet Row Most Recent Value   PT/OT G-Codes    Current Score 36   Projected Score 62   FOTO information reviewed Yes   Assessment Type Evaluation             Precautions: Medical History    Diagnosis Date Comment Source   Baker's cyst, ruptured  Last assessed - 11/11/14    Closed fracture of metatarsal bone 01/13/2009     Exposure to rabies 06/19/2008     Hypertension      Precordial chest pain            Manuals                                                                 Neuro Re-Ed 4/21            Posture Instruct MV            Scap Stab                                                                              Ther Ex 4/21            SADD Stage1 10x4            TB punchouts supine prone             TB ER,IR, Ext             Mat ER,EXT                                                                  Ther Activity                                       Gait Training                                       Modalities

## 2023-04-25 ENCOUNTER — OFFICE VISIT (OUTPATIENT)
Dept: PHYSICAL THERAPY | Facility: CLINIC | Age: 63
End: 2023-04-25

## 2023-04-25 DIAGNOSIS — M25.512 CHRONIC LEFT SHOULDER PAIN: Primary | ICD-10-CM

## 2023-04-25 DIAGNOSIS — G89.29 CHRONIC LEFT SHOULDER PAIN: Primary | ICD-10-CM

## 2023-04-25 NOTE — PROGRESS NOTES
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  Objective: See treatment diary below      Assessment: Tolerated treatment fair  Patient demonstrated fatigue post treatment, exhibited good technique with therapeutic exercises and would benefit from continued PT      Plan: Progress treatment as tolerated         Precautions: Medical History    Diagnosis Date Comment Source   Baker's cyst, ruptured  Last assessed - 14    Closed fracture of metatarsal bone 2009     Exposure to rabies 2008     Hypertension      Precordial chest pain            Manuals               L UT / scap STM in R S/L             L shld IR/ER MRE's 10 x 5 sec hold                                      Neuro Re-Ed             Posture Instruct MV review           Scap Stab  ---                                                                            Ther Ex             SADD Stage1 10x4 reviewed            TB punchouts supine prone  ---           TB ER,IR, Ext  RTB rows, ext, IR x 20 reps each            Mat ER,EXT   ---           FIS w/ PB   10x 5 sec hold            Pulleys   10x 5 sec hold            L UT stretching   seated 3 x 10 sec hold            Seated shoulder ER w/ scap retractions   20x 5 sec hold            Ther Activity                                       Gait Training                                       Modalities               MH L shoulder x 8 min in R S/L

## 2023-04-28 ENCOUNTER — OFFICE VISIT (OUTPATIENT)
Dept: PHYSICAL THERAPY | Facility: CLINIC | Age: 63
End: 2023-04-28

## 2023-04-28 DIAGNOSIS — G89.29 CHRONIC LEFT SHOULDER PAIN: Primary | ICD-10-CM

## 2023-04-28 DIAGNOSIS — M25.512 CHRONIC LEFT SHOULDER PAIN: Primary | ICD-10-CM

## 2023-04-28 NOTE — PROGRESS NOTES
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  When asked if pt typically has neck pain, pt states that sometimes the back of the right side of the neck hurts, but she does not think it is related to her shoulder  Objective: See treatment diary below; pt presents with forward head, rounded shoulder posture while seated  L shoulder flexion AROM = 155 degrees with left anterior shoulder pain (3/10)  L shoulder abduction AROM = 170 degrees    Repeated retraction w/ self-overpressure 10x in standing    L shoulder flexion AROM = 165 degrees with left anterior shoulder pain (3/10  L shoulder abduction AROM = 170 degrees    Assessment: Pt tolerated treatment well  Pt noted no effect on shoulder pain following cervical retractions with shoulder flexion, however demonstrated increased AROM  Pt was educated to incorporate scap retraction and cervical retraction during long bouts of sitting while at work  Pt verbalized that she has made an effort to self-correct posture while seated  Pt presented with weakness into L shoulder ER isometric with PT, which improved with increased repetition, likely due to improved recruitment  Plan: Continue per plan of care  Progress treatment as tolerated            Precautions: Medical History    Diagnosis Date Comment Source   Baker's cyst, ruptured  Last assessed - 14    Closed fracture of metatarsal bone 2009     Exposure to rabies 2008     Hypertension      Precordial chest pain            Manuals         L UT / scap STM in R S/L B/L UT, LS in supine, manual suboccipital release       L shld IR/ER MRE's 10 x 5 sec hold L shoulder alternating IR/ER isos 10x5s                      Neuro Re-Ed 4/21       Posture Instruct MV review Reviewed     Scap Stab  ---      Cervical retractions   10x w/ self-OP                                     Ther Ex 4/21       SADD Stage1 10x4 reviewed       TB punchouts supine prone  ---      TB ER,IR, Ext  RTB rows, ext, IR x 20 reps each  RTB rows, ext, IR, 20x ea        Mat ER,EXT   ---      FIS w/ PB   10x 5 sec hold  10x5s      Pulleys   10x 5 sec hold  2x10 (5s)      L UT stretching   seated 3 x 10 sec hold  5x10s seated     Seated shoulder ER w/ scap retractions   20x 5 sec hold  20x5s      Ther Activity                        Gait Training                        Modalities          MH L shoulder x 8 min in R S/L

## 2023-05-01 ENCOUNTER — OFFICE VISIT (OUTPATIENT)
Dept: FAMILY MEDICINE CLINIC | Facility: CLINIC | Age: 63
End: 2023-05-01

## 2023-05-01 ENCOUNTER — APPOINTMENT (OUTPATIENT)
Dept: RADIOLOGY | Facility: CLINIC | Age: 63
End: 2023-05-01

## 2023-05-01 VITALS
WEIGHT: 175 LBS | SYSTOLIC BLOOD PRESSURE: 110 MMHG | TEMPERATURE: 97.8 F | RESPIRATION RATE: 16 BRPM | HEIGHT: 65 IN | HEART RATE: 84 BPM | BODY MASS INDEX: 29.16 KG/M2 | DIASTOLIC BLOOD PRESSURE: 74 MMHG

## 2023-05-01 DIAGNOSIS — R05.3 CHRONIC COUGH: ICD-10-CM

## 2023-05-01 DIAGNOSIS — R05.3 CHRONIC COUGH: Primary | ICD-10-CM

## 2023-05-01 RX ORDER — DEXTROMETHORPHAN HYDROBROMIDE AND PROMETHAZINE HYDROCHLORIDE 15; 6.25 MG/5ML; MG/5ML
5 SOLUTION ORAL 4 TIMES DAILY PRN
Qty: 240 ML | Refills: 0 | Status: SHIPPED | OUTPATIENT
Start: 2023-05-01 | End: 2023-05-08

## 2023-05-01 RX ORDER — METHYLPREDNISOLONE 4 MG/1
TABLET ORAL
Qty: 1 EACH | Refills: 0 | Status: SHIPPED | OUTPATIENT
Start: 2023-05-01 | End: 2023-05-07

## 2023-05-01 RX ORDER — DOXYCYCLINE HYCLATE 100 MG/1
100 CAPSULE ORAL EVERY 12 HOURS SCHEDULED
Qty: 20 CAPSULE | Refills: 0 | Status: SHIPPED | OUTPATIENT
Start: 2023-05-01 | End: 2023-05-11

## 2023-05-01 NOTE — PROGRESS NOTES
Assessment/Plan:    CXR ordered, will treat as below  Will f/u with results, and pt will let us know how she is doing after she completes course of treatment    1  Chronic cough  -     XR chest pa & lateral; Future; Expected date: 05/01/2023  -     doxycycline hyclate (VIBRAMYCIN) 100 mg capsule; Take 1 capsule (100 mg total) by mouth every 12 (twelve) hours for 10 days  -     Promethazine-DM (PHENERGAN-DM) 6 25-15 mg/5 mL oral syrup; Take 5 mL by mouth 4 (four) times a day as needed for cough for up to 7 days  -     methylPREDNISolone 4 MG tablet therapy pack; Use as directed on package          There are no Patient Instructions on file for this visit  Return if symptoms worsen or fail to improve  Subjective:      Patient ID: Portillo Valencia is a 58 y o  female  Chief Complaint   Patient presents with    Cough     C/O ongoing cough since September Hoang LINDER       Here today with chronic cough  Started feeling sick in September, thought it was Covid, although PCR was negative  Cough has persisted since then, waxing and waning  Felt like a tickle for a while, and then 3 weeks ago, the cough went away spontaneously  4 days ago, cough has returned  This cough is now productive  Hx GERD  Will be going for gastric emptying study tomorrow  Takes PPI daily  GERD symptoms have not been any worse  The following portions of the patient's history were reviewed and updated as appropriate: allergies, current medications, past family history, past medical history, past social history, past surgical history and problem list     Review of Systems   Constitutional: Positive for fatigue  Negative for chills and fever  HENT: Negative for congestion, ear pain, postnasal drip, rhinorrhea, sinus pressure and sore throat  Respiratory: Positive for cough  Negative for shortness of breath and wheezing  Cardiovascular: Negative for chest pain     Gastrointestinal: Negative for abdominal pain, "diarrhea, nausea and vomiting  Musculoskeletal: Negative for arthralgias  Skin: Negative for rash  Neurological: Negative for headaches  Current Outpatient Medications   Medication Sig Dispense Refill    Cozaar 50 MG tablet Take 1 tablet (50 mg total) by mouth daily Unable to tolerate generic, not effective 30 tablet 3    doxycycline hyclate (VIBRAMYCIN) 100 mg capsule Take 1 capsule (100 mg total) by mouth every 12 (twelve) hours for 10 days 20 capsule 0    EPINEPHrine (EPIPEN) 0 3 mg/0 3 mL SOAJ Inject 0 3 mL (0 3 mg total) into a muscle once for 1 dose 0 6 mL 0    Lumigan 0 01 % ophthalmic drops Administer 1 drop to both eyes daily at bedtime      methylPREDNISolone 4 MG tablet therapy pack Use as directed on package 1 each 0    Multiple Vitamins-Minerals (multivitamin with minerals) tablet Take 1 tablet by mouth daily      omeprazole (PriLOSEC) 40 MG capsule TAKE 1 CAPSULE DAILY 90 capsule 0    Promethazine-DM (PHENERGAN-DM) 6 25-15 mg/5 mL oral syrup Take 5 mL by mouth 4 (four) times a day as needed for cough for up to 7 days 240 mL 0    valACYclovir (VALTREX) 1,000 mg tablet Take 2 at onset of cold sore and repeat in 12 hours  20 tablet 3    levothyroxine (Euthyrox) 25 mcg tablet Take 1 tablet (25 mcg total) by mouth daily in the early morning 90 tablet 1     No current facility-administered medications for this visit  Objective:    /74   Pulse 84   Temp 97 8 °F (36 6 °C)   Resp 16   Ht 5' 5 25\" (1 657 m)   Wt 79 4 kg (175 lb)   BMI 28 90 kg/m²        Physical Exam  Vitals and nursing note reviewed  Constitutional:       Appearance: Normal appearance  She is well-developed  HENT:      Right Ear: Tympanic membrane normal       Left Ear: Tympanic membrane normal       Nose: Nose normal       Mouth/Throat:      Pharynx: Oropharynx is clear  Eyes:      Conjunctiva/sclera: Conjunctivae normal    Cardiovascular:      Rate and Rhythm: Normal rate and regular rhythm       " Pulses: Normal pulses  Heart sounds: Normal heart sounds  No murmur heard  Pulmonary:      Effort: Pulmonary effort is normal       Breath sounds: Normal breath sounds  No wheezing  Lymphadenopathy:      Cervical: No cervical adenopathy  Skin:     General: Skin is warm and dry  Neurological:      Mental Status: She is alert     Psychiatric:         Mood and Affect: Mood normal          Behavior: Behavior normal                 Belen Organ, CRNP

## 2023-05-02 ENCOUNTER — OFFICE VISIT (OUTPATIENT)
Dept: PHYSICAL THERAPY | Facility: CLINIC | Age: 63
End: 2023-05-02

## 2023-05-02 DIAGNOSIS — M25.512 CHRONIC LEFT SHOULDER PAIN: Primary | ICD-10-CM

## 2023-05-02 DIAGNOSIS — G89.29 CHRONIC LEFT SHOULDER PAIN: Primary | ICD-10-CM

## 2023-05-02 NOTE — PROGRESS NOTES
Daily Note     Today's date: 2023  Patient name: Alcieds Wilson  : 1960  MRN: [id number]  Referring provider: Gilles Florentino DO  Dx:   Encounter Diagnosis     ICD-10-CM    1  Chronic left shoulder pain  M25 512     G89 29                      Subjective: My left shoulder is very painful  Objective: See treatment diary below      Assessment: Tolerated treatment well  Patient demonstrated fatigue post treatment and exhibited good technique with therapeutic exercises      Plan: Continue per plan of care  Precautions: Medical History    Diagnosis Date Comment Source   Baker's cyst, ruptured  Last assessed - 14    Closed fracture of metatarsal bone 2009     Exposure to rabies 2008     Hypertension      Precordial chest pain            Manuals   5/2      L UT / scap STM in R S/L B/L UT, LS in supine, manual suboccipital release STM      L shld IR/ER MRE's 10 x 5 sec hold  L shoulder alternating IR/ER isos 10x5s  PROM                    Neuro Re-Ed        Posture Instruct MV review Reviewed     Scap Stab  ---      Cervical retractions   10x w/ self-OP 10x                                    Ther Ex        SADD Stage1 10x4 reviewed       TB punchouts supine prone  ---      TB ER,IR, Ext  RTB rows, ext, IR x 20 reps each  RTB rows, ext, IR, 20x ea    RTB 20x    Mat ER,EXT   ---  10x    FIS w/ PB   10x 5 sec hold  10x5s  20x    Pulleys   10x 5 sec hold  2x10 (5s)  20x    L UT stretching   seated 3 x 10 sec hold  5x10s seated 5x10s    Seated shoulder ER w/ scap retractions   20x 5 sec hold  20x5s  20x    Ther Activity                        Gait Training                        Modalities          MH L shoulder x 8 min in R S/L

## 2023-05-05 ENCOUNTER — OFFICE VISIT (OUTPATIENT)
Dept: PHYSICAL THERAPY | Facility: CLINIC | Age: 63
End: 2023-05-05

## 2023-05-05 DIAGNOSIS — M25.512 CHRONIC LEFT SHOULDER PAIN: Primary | ICD-10-CM

## 2023-05-05 DIAGNOSIS — G89.29 CHRONIC LEFT SHOULDER PAIN: Primary | ICD-10-CM

## 2023-05-05 NOTE — PROGRESS NOTES
Daily Note     Today's date: 2023  Patient name: Vinh Velasquez  : 1960  MRN: [id number]  Referring provider: Fadumo Gaffney DO  Dx:   Encounter Diagnosis     ICD-10-CM    1  Chronic left shoulder pain  M25 512     G89 29           Start Time: 1150          Subjective: I'm taking steroids for this cough that I can't get rid of & they are helping my shoulder pain  Therapy is definitely helping also because I'm not getting sharp shoulder pain & I'm sleeping better  Objective: See treatment diary below      Assessment: Tolerated treatment well  Patient demonstrated fatigue post treatment, exhibited good technique with therapeutic exercises and would benefit from continued PT      Plan: Progress treatment as tolerated  Precautions: Medical History    Diagnosis Date Comment Source   Baker's cyst, ruptured  Last assessed - 14    Closed fracture of metatarsal bone 2009     Exposure to rabies 2008     Hypertension      Precordial chest pain            Manuals    FOTO     L UT / scap STM in R S/L B/L UT, LS in supine, manual suboccipital release STM AD     L shld IR/ER MRE's 10 x 5 sec hold  L shoulder alternating IR/ER isos 10x5s  PROM AD                   Neuro Re-Ed        Posture Instruct MV review Reviewed     Scap Stab  ---      Cervical retractions   10x w/ self-OP 10x 10x                                   Ther Ex        SADD Stage1 10x4 reviewed       TB punchouts supine prone  ---      TB ER,IR, Ext  RTB rows, ext, IR x 20 reps each  RTB rows, ext, IR, 20x ea    RTB 20x 20x GTB   Mat ER,EXT   ---  10x    FIS w/ PB   10x 5 sec hold  10x5s  20x 10x 5 sec hold    Pulleys   10x 5 sec hold  2x10 (5s)  20x 10x 5 sec hold    L UT stretching   seated 3 x 10 sec hold  5x10s seated 5x10s L/R 5 x 10 sec hold    Seated shoulder ER w/ scap retractions   20x 5 sec hold  20x5s  20x 20x 5 sec hold    Ther Activity                        Gait Training Modalities          MH L shoulder x 8 min in R S/L

## 2023-05-08 ENCOUNTER — APPOINTMENT (OUTPATIENT)
Dept: PHYSICAL THERAPY | Facility: CLINIC | Age: 63
End: 2023-05-08
Payer: COMMERCIAL

## 2023-05-10 ENCOUNTER — OFFICE VISIT (OUTPATIENT)
Dept: PHYSICAL THERAPY | Facility: CLINIC | Age: 63
End: 2023-05-10

## 2023-05-10 DIAGNOSIS — M25.512 CHRONIC LEFT SHOULDER PAIN: Primary | ICD-10-CM

## 2023-05-10 DIAGNOSIS — G89.29 CHRONIC LEFT SHOULDER PAIN: Primary | ICD-10-CM

## 2023-05-10 NOTE — PROGRESS NOTES
"Daily Note     Today's date: 5/10/2023  Patient name: Radha Chirinos  : 1960  MRN: [id number]  Referring provider: Stephanie Velazco DO  Dx:   Encounter Diagnosis     ICD-10-CM    1  Chronic left shoulder pain  M25 512     G89 29           Start Time: 1703          Subjective: Patient notes more lateral L upper arm \"burning\" sensation  I've been sleeping on the couch on ,y L side because of my cough  Objective: See treatment diary below      Assessment: Tolerated treatment fair  Patient demonstrated fatigue post treatment, exhibited good technique with therapeutic exercises and would benefit from continued PT      Plan: Progress treatment as tolerated  Precautions: Medical History    Diagnosis Date Comment Source   Baker's cyst, ruptured  Last assessed - 14    Closed fracture of metatarsal bone 2009     Exposure to rabies 2008     Hypertension      Precordial chest pain            Manuals 5/10 4/25 4/28 5/2 5/5  FOTO   L UT / scap STM in R S/L AD L UT / scap STM in R S/L B/L UT, LS in supine, manual suboccipital release STM AD   L shld IR/ER MRE's 10 x 5 sec hold AD L shld IR/ER MRE's 10 x 5 sec hold  L shoulder alternating IR/ER isos 10x5s  PROM AD                   Neuro Re-Ed        Posture Instruct --- review Reviewed     Scap Stab -- ---      Cervical retractions 10 5 sec hold   10x w/ self-OP 10x 10x           Wall walks  10x R/L YTB               Cane ext / IR 2# x 10 reps each        Ther Ex        SADD Stage1 ---- reviewed       TB punchouts supine prone ---- ---      TB ER,IR, Ext 20x GTB RTB rows, ext, IR x 20 reps each  RTB rows, ext, IR, 20x ea    RTB 20x 20x GTB     ---  10x    FIS w/ PB  10x 5 sec hold 10x 5 sec hold  10x5s  20x 10x 5 sec hold    Pulleys  10x 5 sec hold  10x 5 sec hold  2x10 (5s)  20x 10x 5 sec hold    L UT stretching  L/R 5 x 10 sec hold seated 3 x 10 sec hold  5x10s seated 5x10s L/R 5 x 10 sec hold    Seated shoulder ER w/ scap retractions  20x 5 " sec hold  20x 5 sec hold  20x5s  20x 20x 5 sec hold    Ther Activity                        Gait Training                        Modalities          MH L shoulder x 8 min in R S/L

## 2023-05-17 ENCOUNTER — APPOINTMENT (OUTPATIENT)
Dept: PHYSICAL THERAPY | Facility: CLINIC | Age: 63
End: 2023-05-17
Payer: COMMERCIAL

## 2023-05-19 ENCOUNTER — OFFICE VISIT (OUTPATIENT)
Dept: PHYSICAL THERAPY | Facility: CLINIC | Age: 63
End: 2023-05-19

## 2023-05-19 DIAGNOSIS — M25.512 CHRONIC LEFT SHOULDER PAIN: Primary | ICD-10-CM

## 2023-05-19 DIAGNOSIS — G89.29 CHRONIC LEFT SHOULDER PAIN: Primary | ICD-10-CM

## 2023-05-19 NOTE — PROGRESS NOTES
"Daily Note     Today's date: 2023  Patient name: Luther Bullock  : 1960  MRN: [id number]  Referring provider: Dyana Horvath DO  Dx:   Encounter Diagnosis     ICD-10-CM    1  Chronic left shoulder pain  M25 512     G89 29           Start Time: 1145          Subjective: Patient reports occasional L shoulder \"twinges\"  I've had rough nights, but last night was fine  Objective: See treatment diary below      Assessment: Tolerated treatment fair  Patient demonstrated fatigue post treatment, exhibited good technique with therapeutic exercises and would benefit from continued PT      Plan: Progress treatment as tolerated  Precautions: Medical History    Diagnosis Date Comment Source   Baker's cyst, ruptured  Last assessed - 14    Closed fracture of metatarsal bone 2009     Exposure to rabies 2008     Hypertension      Precordial chest pain            Manuals  5  FOTO   L UT / scap STM in R S/L AD  B/L UT, LS in supine, manual suboccipital release STM AD   L shld IR/ER MRE's 10 x 5 sec hold AD  L shoulder alternating IR/ER isos 10x5s  PROM AD                   Neuro Re-Ed        Posture Instruct ---  Reviewed     Scap Stab -- ---      Cervical retractions 10 5 sec hold   10x w/ self-OP 10x 10x           Wall walks  10x R/L YTB               Cane ext / IR 2# x 10 reps each        Ther Ex        SADD Stage1 ----       TB punchouts supine prone ----       TB ER,IR, Ext 20x GTB  RTB rows, ext, IR, 20x ea    RTB 20x 20x GTB   shld flexion supine  10x YTB    10x    FIS w/ PB  10x 5 sec hold  10x5s  20x 10x 5 sec hold    Pulleys  10x 5 sec hold   2x10 (5s)  20x 10x 5 sec hold    L UT stretching  L/R 5 x 10 sec hold  5x10s seated 5x10s L/R 5 x 10 sec hold    Seated shoulder ER w/ scap retractions  20x 5 sec hold YTB  20x5s  20x 20x 5 sec hold    Ther Activity                        Gait Training                        Modalities          MH L shoulder x 8 min in R S/L       "

## 2023-05-24 ENCOUNTER — OFFICE VISIT (OUTPATIENT)
Dept: PHYSICAL THERAPY | Facility: CLINIC | Age: 63
End: 2023-05-24

## 2023-05-24 DIAGNOSIS — M25.512 CHRONIC LEFT SHOULDER PAIN: Primary | ICD-10-CM

## 2023-05-24 DIAGNOSIS — G89.29 CHRONIC LEFT SHOULDER PAIN: Primary | ICD-10-CM

## 2023-05-24 NOTE — PROGRESS NOTES
Daily Note     Today's date: 2023  Patient name: Rishabh Pisano  : 1960  MRN: [id number]  Referring provider: Radha Colindres DO  Dx:   Encounter Diagnosis     ICD-10-CM    1  Chronic left shoulder pain  M25 512     G89 29           Start Time: 930  Stop Time: 1010  Total time in clinic (min): 40 minutes    Subjective: Patient reports L shoulder soreness/ tightness  Objective: See treatment diary below      Assessment: Tolerated treatment fair  Patient demonstrated fatigue post treatment, exhibited good technique with therapeutic exercises and would benefit from continued PT      Plan: Progress treatment as tolerated  Precautions: Medical History    Diagnosis Date Comment Source   Baker's cyst, ruptured  Last assessed - 14    Closed fracture of metatarsal bone 2009     Exposure to rabies 2008     Hypertension      Precordial chest pain            Manuals  5  FOTO   L UT / scap STM in R S/L AD  B/L UT, LS in supine, manual suboccipital release STM AD   L shld IR/ER MRE's 10 x 5 sec hold AD  L shoulder alternating IR/ER isos 10x5s  PROM AD                   Neuro Re-Ed        Posture Instruct ---  Reviewed     Scap Stab --- ---      Cervical retractions ----   10x w/ self-OP 10x 10x           Wall walks  10x R/L YTB (HEP)               Cane ext / IR 2# x 10 reps each        Ther Ex        SADD Stage1 ----       TB punchouts supine prone ----       TB IR, Ext, rows 20x GTB  RTB rows, ext, IR, 20x ea    RTB 20x 20x GTB   shld flexion supine  2x10 reps YTB   10x    FIS w/ PB  10x 5 sec hold  10x5s  20x 10x 5 sec hold    Pulleys  10x 5 sec hold   2x10 (5s)  20x 10x 5 sec hold    L UT stretching  L/R 5 x 10 sec hold  5x10s seated 5x10s L/R 5 x 10 sec hold    Seated shoulder ER w/ scap retractions  20x 5 sec hold YTB  20x5s  20x 20x 5 sec hold    Ther Activity                        Gait Training                        Modalities          MH L shoulder x 8 min in R S/L

## 2023-06-07 ENCOUNTER — OFFICE VISIT (OUTPATIENT)
Dept: PHYSICAL THERAPY | Facility: CLINIC | Age: 63
End: 2023-06-07
Payer: COMMERCIAL

## 2023-06-07 DIAGNOSIS — M25.512 CHRONIC LEFT SHOULDER PAIN: Primary | ICD-10-CM

## 2023-06-07 DIAGNOSIS — G89.29 CHRONIC LEFT SHOULDER PAIN: Primary | ICD-10-CM

## 2023-06-07 PROCEDURE — 97112 NEUROMUSCULAR REEDUCATION: CPT

## 2023-06-07 PROCEDURE — 97110 THERAPEUTIC EXERCISES: CPT

## 2023-06-07 NOTE — PROGRESS NOTES
Daily Note     Today's date: 2023  Patient name: Peg Castaneda  : 1960  MRN: [id number]  Referring provider: Maribel Gonsalves DO  Dx:   Encounter Diagnosis     ICD-10-CM    1  Chronic left shoulder pain  M25 512     G89 29           Start Time: 170  Stop Time: 55  Total time in clinic (min): 51 minutes    Subjective: Patient reports L shoulder pain when sleeping, I get up in order to relieve the pain  Objective: See treatment diary below      Assessment: Tolerated treatment fair  Patient demonstrated fatigue post treatment, exhibited good technique with therapeutic exercises and would benefit from continued PT      Plan: Progress treatment as tolerated  Precautions: Medical History    Diagnosis Date Comment Source   Baker's cyst, ruptured  Last assessed - 14    Closed fracture of metatarsal bone 2009     Exposure to rabies 2008     Hypertension      Precordial chest pain            Manuals   FOTO   L UT / scap STM in R S/L AD  B/L UT, LS in supine, manual suboccipital release STM AD   L shld IR/ER MRE's 10 x 5 sec hold AD  L shoulder alternating IR/ER isos 10x5s  PROM AD                   Neuro Re-Ed        Posture Instruct ---  Reviewed     Scap Stab --- ---      Cervical retractions ----   10x w/ self-OP 10x 10x           Wall walks  10x R/L YTB (HEP)               Cane ext / IR 3# x 10 reps each        Ther Ex        SADD Stage1 ----       TB punchouts supine prone ----       TB IR, Ext, rows 20x GTB  RTB rows, ext, IR, 20x ea    RTB 20x 20x GTB   shld flexion supine  2x10 reps YTB   10x    FIS w/ PB  10x 5 sec hold  10x5s  20x 10x 5 sec hold    Pulleys  10x 5 sec hold   2x10 (5s)  20x 10x 5 sec hold    L UT stretching  L/R 5 x 10 sec hold  5x10s seated 5x10s L/R 5 x 10 sec hold    Seated shoulder ER w/ scap retractions  20x 5 sec hold YTB  20x5s  20x 20x 5 sec hold    Ther Activity                        Gait Training Modalities          MH L shoulder x 8 min in R S/L

## 2023-06-09 ENCOUNTER — OFFICE VISIT (OUTPATIENT)
Dept: PHYSICAL THERAPY | Facility: CLINIC | Age: 63
End: 2023-06-09
Payer: COMMERCIAL

## 2023-06-09 DIAGNOSIS — M25.512 CHRONIC LEFT SHOULDER PAIN: Primary | ICD-10-CM

## 2023-06-09 DIAGNOSIS — G89.29 CHRONIC LEFT SHOULDER PAIN: Primary | ICD-10-CM

## 2023-06-09 PROCEDURE — 97112 NEUROMUSCULAR REEDUCATION: CPT

## 2023-06-09 PROCEDURE — 97110 THERAPEUTIC EXERCISES: CPT

## 2023-06-09 NOTE — PROGRESS NOTES
Daily Note     Today's date: 2023  Patient name: Chevy Candelario  : 1960  MRN: [id number]  Referring provider: Antoinette Mortimer, DO  Dx:   Encounter Diagnosis     ICD-10-CM    1  Chronic left shoulder pain  M25 512     G89 29           Start Time: 1055          Subjective: Patient reports increased L shoulder soreness following last session, but better today  Objective: See treatment diary below      Assessment: Tolerated treatment fair  Patient demonstrated fatigue post treatment, exhibited good technique with therapeutic exercises and would benefit from continued PT      Plan: Progress treatment as tolerated  Precautions: Medical History    Diagnosis Date Comment Source   Baker's cyst, ruptured  Last assessed - 14    Closed fracture of metatarsal bone 2009     Exposure to rabies 2008     Hypertension      Precordial chest pain            Manuals  5  FOTO   L UT / scap STM in R S/L AD  B/L UT, LS in supine, manual suboccipital release STM AD   L shld IR/ER MRE's 10 x 5 sec hold AD  L shoulder alternating IR/ER isos 10x5s  PROM AD                   Neuro Re-Ed        Posture Instruct Standing wall lashonda stretch  5x   Reviewed      --- ---      Cervical retractions ----   10x w/ self-OP 10x 10x           Wall walks  10x R/L YTB (HEP)               Cane ext / IR 3# x 10 reps each        Ther Ex Supine scap retractions 10 x  5 sec hold        SADD Stage1 ----       TB punchouts supine prone ----       TB IR, Ext, rows 20x GTB  RTB rows, ext, IR, 20x ea    RTB 20x 20x GTB   shld flexion supine  2x10 reps YTB   10x    FIS w/ PB  10x 5 sec hold  10x5s  20x 10x 5 sec hold    Pulleys  ----  2x10 (5s)  20x 10x 5 sec hold    L UT stretching  L/R 5 x 10 sec hold  5x10s seated 5x10s L/R 5 x 10 sec hold    Seated shoulder ER w/ scap retractions  20x 5 sec hold YTB  20x5s  20x 20x 5 sec hold    Ther Activity S/L ER  2 x 10 reps                        Gait Training Modalities          MH L shoulder x 8 min in R S/L

## 2023-06-14 ENCOUNTER — EVALUATION (OUTPATIENT)
Dept: PHYSICAL THERAPY | Facility: CLINIC | Age: 63
End: 2023-06-14
Payer: COMMERCIAL

## 2023-06-14 DIAGNOSIS — G89.29 CHRONIC LEFT SHOULDER PAIN: Primary | ICD-10-CM

## 2023-06-14 DIAGNOSIS — M25.512 CHRONIC LEFT SHOULDER PAIN: Primary | ICD-10-CM

## 2023-06-14 PROCEDURE — 97110 THERAPEUTIC EXERCISES: CPT | Performed by: PHYSICAL MEDICINE & REHABILITATION

## 2023-06-14 PROCEDURE — 97112 NEUROMUSCULAR REEDUCATION: CPT | Performed by: PHYSICAL MEDICINE & REHABILITATION

## 2023-06-14 NOTE — PROGRESS NOTES
Re-Evaluation     Today's date: 2023  Patient name: Veronica Chowdhury  : 1960  MRN: [id number]  Referring provider: Chiara Jovel DO  Dx:   Encounter Diagnosis     ICD-10-CM    1  Chronic left shoulder pain  M25 512     G89 29                      Subjective: Patient reports she feels about a 70% improvement in symptoms of shoulder pain  Patient notes that she still does get some discomfort if she lays on the left side or over uses it  Patient notes overall she is pleased with her progress  Objective: See treatment diary below    C-SPINE:  Cervical spine cleared with no symptom referral    (* = pain)    MMT:    Right  Left    Shoulder flexion:  5/5  4+/5*  Shoulder abduction:  5/5  4/5*  Shoulder extension:  5/5  5/5  Shoulder IR neutral:  5/5  5/5  Shoulder ER neutral:  5/5  4/5*    AROM:    Right  Left    Shoulder flexion:  180  180  Shoulder abduction:  180*  180  Shoulder extension:  70  70  Shoulder IR:    T8  T10  Shoulder ER:   T2  T2      PALPATION:  No tenderness to palpation present along shoulder tendons    SPECIAL TESTS:    Impingement cluster: (3/5 or 1-3)  7-Lwrqowe-Pqxarhm: (+) L  2-External rotation resistance: (+) L  3-Painful arc: (+) L  4-Empty can: (+) L  5-Neer: (+) L    RTC full thickness tear cluster: (3/3)  Painful arc: (+) L  Drop arm: (-)  External rotation resistance: (+) L    Others:  Sulcus: (-)  O'Brians active compression: (-)  Jobes relocation test: (-)  Apprehension test: (-)        Assessment: Tolerated treatment well  Patient has progressed well throughout therapy, would benefit from a few more sessions to focus on shoulder external rotation strength, HEP updated  Patient demonstrated fatigue post treatment and exhibited good technique with therapeutic exercises      Plan: Continue per plan of care        Precautions: Medical History    Diagnosis Date Comment Source   Baker's cyst, ruptured  Last assessed - 14    Closed fracture of metatarsal bone 2009 Exposure to rabies 06/19/2008     Hypertension      Precordial chest pain            Manuals 6/9 06/13/2023 4/28 5/2 5/5  FOTO   L UT / scap STM in R S/L AD TC B/L UT, LS in supine, manual suboccipital release STM AD   L shld IR/ER MRE's 10 x 5 sec hold AD TC L shoulder alternating IR/ER isos 10x5s  PROM AD                   Neuro Re-Ed        Posture Instruct Standing wall lashonda stretch  5x  Standing wall lashonda stretch  5x  Reviewed      ---       Cervical retractions ----   10x w/ self-OP 10x 10x           Wall walks  10x R/L YTB (HEP) 10x R/L YTB              Cane ext / IR 3# x 10 reps each  3# x 10 reps each       Ther Ex Supine scap retractions 10 x  5 sec hold        SADD Stage1 ----       TB punchouts supine prone ----       TB IR, Ext, rows 20x GTB 20x GTB RTB rows, ext, IR, 20x ea    RTB 20x 20x GTB   Sidelying shld ER  #1 2x10 in right sidelying      shld flexion supine  2x10 reps YTB   10x    FIS w/ PB  10x 5 sec hold 10x 5 sec hold 10x5s  20x 10x 5 sec hold    Pulleys  ---- 10x 5 sec hold  2x10 (5s)  20x 10x 5 sec hold    L UT stretching  L/R 5 x 10 sec hold  5x10s seated 5x10s L/R 5 x 10 sec hold    Seated shoulder ER w/ scap retractions  20x 5 sec hold YTB 20x 5 sec hold YTB 20x5s  20x 20x 5 sec hold    Ther Activity S/L ER  2 x 10 reps        Re-eval  TC              Gait Training                        Modalities

## 2023-06-19 ENCOUNTER — OFFICE VISIT (OUTPATIENT)
Dept: PHYSICAL THERAPY | Facility: CLINIC | Age: 63
End: 2023-06-19
Payer: COMMERCIAL

## 2023-06-19 DIAGNOSIS — M25.512 CHRONIC LEFT SHOULDER PAIN: Primary | ICD-10-CM

## 2023-06-19 DIAGNOSIS — G89.29 CHRONIC LEFT SHOULDER PAIN: Primary | ICD-10-CM

## 2023-06-19 PROCEDURE — 97110 THERAPEUTIC EXERCISES: CPT

## 2023-06-19 PROCEDURE — 97112 NEUROMUSCULAR REEDUCATION: CPT

## 2023-06-19 NOTE — PROGRESS NOTES
Daily Note     Today's date: 2023  Patient name: Magen Alvarez  : 1960  MRN: [id number]  Referring provider: Tayo Nina DO  Dx:   Encounter Diagnosis     ICD-10-CM    1  Chronic left shoulder pain  M25 512     G89 29           Start Time: 927          Subjective: My shoulder is sore from lifting my grandson over the weekend  Objective: See treatment diary below      Assessment: Tolerated treatment fair  Patient demonstrated fatigue post treatment, exhibited good technique with therapeutic exercises and would benefit from continued PT      Plan: Progress treatment as tolerated  Precautions: Medical History    Diagnosis Date Comment Source   Baker's cyst, ruptured  Last assessed - 14    Closed fracture of metatarsal bone 2009     Exposure to rabies 2008     Hypertension      Precordial chest pain            Manuals 2023  FOTO   L UT / scap STM in R S/L AD TC AD  AD   L shld IR/ER MRE's 10 x 5 sec hold AD TC AD  AD                   Neuro Re-Ed        Posture Instruct Standing wall lashonda stretch  5x  Standing wall lashonda stretch  5x  Standing wall lashonda stretch  5x      ---       Cervical retractions ----                Wall walks  10x R/L YTB (HEP) 10x R/L YTB 10x R/L YTB             Cane ext / IR 3# x 10 reps each  3# x 10 reps each  3# x 10 reps each     Ther Ex Supine scap retractions 10 x  5 sec hold        SADD Stage1 ----       TB punchouts supine prone ----       TB IR, Ext, rows 20x GTB 20x GTB GTB rows, ext, IR, 20x ea     20x GTB   Sidelying shld ER  #1 2x10 in right sidelying #1 2x10 in R S/L     shld flexion supine  2x10 reps YTB  2x10 reps YTB     FIS w/ PB  10x 5 sec hold 10x 5 sec hold ----  10x 5 sec hold    Pulleys  ---- 10x 5 sec hold  10x 5 sec hold    10x 5 sec hold    L UT stretching  L/R 5 x 10 sec hold  5x10s seated  L/R 5 x 10 sec hold    Seated shoulder ER w/ scap retractions  20x 5 sec hold YTB 20x 5 sec hold YTB 20 x 5sec YTB  20x 5 sec hold    Ther Activity S/L ER  2 x 10 reps   (above)     Re-eval  TC              Gait Training                        Modalities           CP L shoulder supine (EOS)

## 2023-06-23 ENCOUNTER — OFFICE VISIT (OUTPATIENT)
Dept: PHYSICAL THERAPY | Facility: CLINIC | Age: 63
End: 2023-06-23
Payer: COMMERCIAL

## 2023-06-23 DIAGNOSIS — G89.29 CHRONIC LEFT SHOULDER PAIN: Primary | ICD-10-CM

## 2023-06-23 DIAGNOSIS — M25.512 CHRONIC LEFT SHOULDER PAIN: Primary | ICD-10-CM

## 2023-06-23 PROCEDURE — 97110 THERAPEUTIC EXERCISES: CPT

## 2023-06-23 PROCEDURE — 97112 NEUROMUSCULAR REEDUCATION: CPT

## 2023-06-23 NOTE — PROGRESS NOTES
Daily Note     Today's date: 2023  Patient name: Caity Castañeda  : 1960  MRN: [id number]  Referring provider: Tracey Loredo DO  Dx:   Encounter Diagnosis     ICD-10-CM    1  Chronic left shoulder pain  M25 512     G89 29           Start Time: 929          Subjective: Patient reports compliance with HEP & working on her posture  Objective: See treatment diary below      Assessment: Tolerated treatment well  Patient exhibited good technique with therapeutic exercises      Plan: D/C with HEP     Precautions: Medical History    Diagnosis Date Comment Source   Baker's cyst, ruptured  Last assessed - 14    Closed fracture of metatarsal bone 2009     Exposure to rabies 2008     Hypertension      Precordial chest pain            Manuals 2023  FOTO   L UT / scap STM in R S/L AD TC AD  AD   L shld IR/ER MRE's 10 x 5 sec hold AD TC AD  AD                   Neuro Re-Ed        Posture Instruct Standing wall lashonda stretch  5x  Standing wall lashonda stretch  5x  Standing wall lashonda stretch  10x      ---       Cervical retractions ----                Wall walks  10x R/L YTB (HEP) 10x R/L YTB 10x R/L YTB             Cane ext / IR 3# x 10 reps each  3# x 10 reps each  3# x 10 reps each     Ther Ex Supine scap retractions 10 x  5 sec hold        SADD Stage1 ----       TB punchouts supine prone ----       TB IR, Ext, rows 20x GTB 20x GTB GTB rows, ext, IR, 20x ea     20x GTB   Sidelying shld ER  #1 2x10 in right sidelying #1 2x10 in R S/L     shld flexion supine  2x10 reps YTB  2x10 reps YTB     FIS w/ PB  10x 5 sec hold 10x 5 sec hold 10x 5 sec hold   10x 5 sec hold    Pulleys  ---- 10x 5 sec hold  ----  10x 5 sec hold    L UT stretching  L/R 5 x 10 sec hold  5x10s seated  L/R 5 x 10 sec hold    Seated shoulder ER w/ scap retractions  20x 5 sec hold YTB 20x 5 sec hold YTB 20 x 5sec YTB  20x 5 sec hold    Ther Activity S/L ER  2 x 10 reps   (above)     Re-eval  TC Gait Training                        Modalities

## 2023-06-29 DIAGNOSIS — I10 BENIGN ESSENTIAL HYPERTENSION: ICD-10-CM

## 2023-06-29 RX ORDER — LOSARTAN POTASSIUM 50 MG
50 TABLET ORAL DAILY
Qty: 30 TABLET | Refills: 3 | Status: SHIPPED | OUTPATIENT
Start: 2023-06-29

## 2023-06-29 NOTE — TELEPHONE ENCOUNTER
----- Message from Will Peterson sent at 6/28/2023  8:20 PM EDT -----  Regarding: [facility]  Contact: [phone]  Hi Dr Namita Thacker,    I seem to be doing well on the new blood pressure med  I just had a refill at [facility], but can you please send this prescription to my [facility]? Thanks and have a great day!   Shawna Clark

## 2023-07-18 DIAGNOSIS — I10 BENIGN ESSENTIAL HYPERTENSION: ICD-10-CM

## 2023-07-18 RX ORDER — LOSARTAN POTASSIUM 50 MG
50 TABLET ORAL DAILY
Qty: 90 TABLET | Refills: 0 | Status: SHIPPED | OUTPATIENT
Start: 2023-07-18

## 2023-07-23 DIAGNOSIS — R73.09 ABNORMAL GLUCOSE: Primary | ICD-10-CM

## 2023-07-23 DIAGNOSIS — R21 RASH: ICD-10-CM

## 2023-07-24 ENCOUNTER — OFFICE VISIT (OUTPATIENT)
Dept: FAMILY MEDICINE CLINIC | Facility: CLINIC | Age: 63
End: 2023-07-24
Payer: COMMERCIAL

## 2023-07-24 VITALS
DIASTOLIC BLOOD PRESSURE: 60 MMHG | TEMPERATURE: 99 F | HEIGHT: 65 IN | SYSTOLIC BLOOD PRESSURE: 148 MMHG | BODY MASS INDEX: 28.86 KG/M2 | WEIGHT: 173.2 LBS | HEART RATE: 68 BPM | RESPIRATION RATE: 18 BRPM

## 2023-07-24 DIAGNOSIS — R21 RASH: Primary | ICD-10-CM

## 2023-07-24 PROCEDURE — 99213 OFFICE O/P EST LOW 20 MIN: CPT | Performed by: NURSE PRACTITIONER

## 2023-07-24 RX ORDER — DOXYCYCLINE HYCLATE 100 MG
100 TABLET ORAL 2 TIMES DAILY
Qty: 28 TABLET | Refills: 0 | Status: SHIPPED | OUTPATIENT
Start: 2023-07-24 | End: 2023-08-07

## 2023-07-24 NOTE — PROGRESS NOTES
Assessment/Plan:  Will start on doxy and advised not to take any kind of MVI or OTC while on doxy and will take precautions to avoid sun exposure while on doxy to prevent sun burn, will do blood work to confirm lyme  1. Rash  -     doxycycline hyclate (VIBRA-TABS) 100 mg tablet; Take 1 tablet (100 mg total) by mouth 2 (two) times a day for 14 days              Patient Instructions: Take medication with food. It is important that you take the entire course of antibiotics prescribed. May also take a probiotic of your choice to maintain healthy GI daron. Can take some probiotic and yogurt with the medication. Supportive care discussed and advised. Advised to RTO for any worsening and no improvement. Follow up for no improvement and worsening of conditions. Patient advised and educated when to see immediate medical care. Return if symptoms worsen or fail to improve. No future appointments. Subjective:      Patient ID: Corrine Malik is a 58 y.o. female. Chief Complaint   Patient presents with   • Rash     Top inner right thigh, onset about a month    • Headache     Ccma HK         Vitals:  /60   Pulse 68   Temp 99 °F (37.2 °C)   Resp 18   Ht 5' 5" (1.651 m)   Wt 78.6 kg (173 lb 3.2 oz)   BMI 28.82 kg/m²     HPI  Patient stated that was in [place] couple of weeks ago ago and stated that something bit her in right groin area and stated that thought was mosquito bite but rash got bigger and other area close to it appeared it and concerned about lyme disease. Stated that also having some jaw discomfort, headache and fatigue. The following portions of the patient's history were reviewed and updated as appropriate: allergies, current medications, past family history, past medical history, past social history, past surgical history and problem list.      Review of Systems   Constitutional: Positive for fatigue.  Negative for chills, diaphoresis, fever and unexpected weight change. HENT: Negative for congestion, dental problem, drooling, ear discharge, ear pain, facial swelling, hearing loss, mouth sores, nosebleeds, postnasal drip, rhinorrhea, sinus pressure, sinus pain, sneezing, sore throat, tinnitus, trouble swallowing and voice change. Eyes: Negative for pain. Respiratory: Negative for cough, chest tightness, shortness of breath and wheezing. Cardiovascular: Negative. Gastrointestinal: Negative for abdominal pain, constipation, diarrhea, nausea and vomiting. Musculoskeletal: Positive for arthralgias and neck pain. Skin: Positive for rash. Neurological: Positive for headaches. Negative for dizziness and light-headedness. Objective:    Social History     Tobacco Use   Smoking Status Former   • Packs/day: 0.25   • Years: 7.00   • Total pack years: 1.75   • Types: Cigarettes   • Start date: 6/15/1972   • Quit date: 6/15/1979   • Years since quittin.1   Smokeless Tobacco Never       Allergies: Allergies   Allergen Reactions   • Cephalexin Anaphylaxis   • Other Hives     PRESERVATIVES          Current Outpatient Medications   Medication Sig Dispense Refill   • Cozaar 50 MG tablet Take 1 tablet (50 mg total) by mouth daily Unable to tolerate generic, not effective 90 tablet 0   • doxycycline hyclate (VIBRA-TABS) 100 mg tablet Take 1 tablet (100 mg total) by mouth 2 (two) times a day for 14 days 28 tablet 0   • EPINEPHrine (EPIPEN) 0.3 mg/0.3 mL SOAJ Inject 0.3 mL (0.3 mg total) into a muscle once for 1 dose 0.6 mL 0   • Lumigan 0.01 % ophthalmic drops Administer 1 drop to both eyes daily at bedtime     • Multiple Vitamins-Minerals (multivitamin with minerals) tablet Take 1 tablet by mouth daily     • omeprazole (PriLOSEC) 40 MG capsule TAKE 1 CAPSULE DAILY 90 capsule 0   • valACYclovir (VALTREX) 1,000 mg tablet Take 2 at onset of cold sore and repeat in 12 hours. 20 tablet 3     No current facility-administered medications for this visit. Physical Exam  Constitutional:       Appearance: Normal appearance. HENT:      Head: Normocephalic and atraumatic. Nose: Nose normal.   Eyes:      Conjunctiva/sclera: Conjunctivae normal.   Cardiovascular:      Rate and Rhythm: Normal rate and regular rhythm. Pulses: Normal pulses. Heart sounds: Normal heart sounds. Pulmonary:      Effort: Pulmonary effort is normal.      Breath sounds: Normal breath sounds. Skin:     General: Skin is warm and dry. Findings: Rash (diffused erythematous macular rash appeared on right groin and circular erythematous rash about 4 cm in diameter appeared next to it) present. Neurological:      Mental Status: She is alert and oriented to person, place, and time. Psychiatric:         Mood and Affect: Mood normal.         Behavior: Behavior normal.         Thought Content: Thought content normal.         Judgment: Judgment normal.                     KANDI Barreto        Answers for HPI/ROS submitted by the patient on 7/23/2023  nail changes:  No

## 2023-07-24 NOTE — PATIENT INSTRUCTIONS
Doxycycline (By mouth)   Doxycycline (szr-r-[id number]-[id number])  Treats and prevents infections. Also used to prevent malaria and treat rosacea or severe acne. This medicine is a tetracycline antibiotic. Brand Name(s): Acticlate, Adoxa, Adoxa Thor 1/150, Avidoxy, Doryx, Doryx MPC, LymePak, Mondoxyne NL, Monodox, Morgidox [id number], Morgidox [id number], Oracea, Targadox, Vibramycin Calcium, Vibramycin Hyclate   There may be other brand names for this medicine. When This Medicine Should Not Be Used: This medicine is not right for everyone. Do not use it if you had an allergic reaction to doxycycline or another tetracycline antibiotic, or if you are pregnant or breastfeeding. How to Use This Medicine:   Capsule, Delayed Release Capsule, Long Acting Capsule, Liquid, Tablet, Delayed Release Tablet  Your doctor will tell you how much medicine to use. Do not use more than directed. Ask your pharmacist or doctor if you need to take this medicine with or without food. Some forms can be taken with food or milk, but others must be taken on an empty stomach. Capsule: Swallow whole. Do not break, crush, chew, or open it. Oracea® capsules: This medicine must be taken on an empty stomach, at least 1 hour before or 2 hours after a meal.  Acticlate® Cap capsules: You may take this medicine with food or milk to avoid stomach irritation. Delayed-release capsules: You may also take this medicine by sprinkling the open capsules onto cold, soft applesauce. Do not lose any pellets when transferring the contents. Swallow this mixture right away. Do not chew it. Do not store the mixture for later use. You may take this medicine with food or milk to avoid stomach upset. Delayed-release tablets: You may also take this medicine by sprinkling the broken tablets onto room-temperature applesauce. Swallow this mixture right away. Do not chew it. Do not store the mixture for later use.  You may take this medicine with food or milk to avoid stomach upset.  Oral liquid: Shake the bottle well just before each use. Measure the oral liquid medicine with a marked measuring spoon, oral syringe, or medicine cup. Tablets: You may take this medicine with food or milk to avoid stomach irritation. To break a tablet, hold the tablet between your thumb and index fingers close to the appropriate scored line. Then, apply enough pressure to snap the tablet segments apart. Do not use the tablet if it does not break on the scored lines. Take all of the medicine in your prescription to clear up your infection, even if you feel better after the first few doses. Drink plenty of fluids to avoid throat problems, if you take the capsule or tablet form. Malaria prevention: Start taking the medicine 1 or 2 days before you travel. Take the medicine every day during your trip. Keep taking it for 4 weeks after you return. However, do not use the medicine for longer than 4 months. Do not use this medicine for more than 9 months if you are using it for rosacea. Use only the brand of medicine your doctor prescribed. Other brands may not work the same way. Read and follow the patient instructions that come with this medicine. Talk to your doctor or pharmacist if you have any questions. Missed dose: Take a dose as soon as you remember. If it is almost time for your next dose, wait until then and take a regular dose. Do not take extra medicine to make up for a missed dose. Store the medicine in a closed container at room temperature, away from heat, moisture, and direct light. Do not freeze the oral liquid. Drugs and Foods to Avoid:   Ask your doctor or pharmacist before using any other medicine, including over-the-counter medicines, vitamins, and herbal products. Some medicines can affect how doxycycline works.  Tell your doctor if you are using any of the following:  Bismuth subsalicylate  Acne medicines (including isotretinoin)  Birth control pills  Blood thinner (including warfarin)  Medicine for seizures (including carbamazepine, phenobarbital, phenytoin)  Medicine that contains aluminum, calcium, magnesium, or iron (including an antacid or vitamin supplement)  Medicine to treat psoriasis (including acitretin)  Penicillin antibiotic  Stomach medicine  Warnings While Using This Medicine: This medicine may cause birth defects if either partner is using it during conception or pregnancy. Tell your doctor right away if you or your partner becomes pregnant. Birth control pills may not work as well when used together with this medicine. Use a second form of birth control to keep from getting pregnant. Tell your doctor if you have kidney disease, liver disease, asthma, or an allergy to sulfites. Tell your doctor if you had surgery on your stomach, or if you have a history of yeast infections. This medicine may cause the following problems:  Permanent change in tooth color (in children younger than 6years of age)  Serious skin reactions, including drug reaction with eosinophilia and systemic symptoms (DRESS)  Increased pressure inside the head  Yeast infection  Immune system problems  This medicine can cause diarrhea. Call your doctor if the diarrhea becomes severe, does not stop, or is bloody. Do not take any medicine to stop diarrhea until you have talked to your doctor. Diarrhea can occur 2 months or more after you stop taking this medicine. This medicine may make your skin more sensitive to sunlight. Wear sunscreen. Do not use sunlamps or tanning beds. Tell any doctor or dentist who treats you that you are using this medicine. This medicine may affect certain medical test results. Call your doctor if your symptoms do not improve or if they get worse. Your doctor will do lab tests at regular visits to check on the effects of this medicine. Keep all appointments. Keep all medicine out of the reach of children. Never share your medicine with anyone.   Possible Side Effects While Using This Medicine:   Call your doctor right away if you notice any of these side effects: Allergic reaction: Itching or hives, swelling in your face or hands, swelling or tingling in your mouth or throat, chest tightness, trouble breathing  Blistering, peeling, red skin rash  Burning, pain, or irritation in your upper stomach or throat  Diarrhea that may contain blood  Fever, chills, cough, runny or stuffy nose, sore throat, body aches  Joint pain, unusual tiredness or weakness  Severe headache, dizziness, vision changes  Sudden and severe stomach pain, nausea, vomiting, lightheadedness  Swollen, painful, or tender lymph glands in the neck, armpit, or groin, or yellow skin or eyes  If you notice these less serious side effects, talk with your doctor:   Darkening of your skin, scars, teeth, or gums  Sores or white patches on your lips, mouth, or throat  If you notice other side effects that you think are caused by this medicine, tell your doctor. Call your doctor for medical advice about side effects. You may report side effects to FDA at 0-353-FDA-1503  © Copyright [employer] 2022 Information is for End User's use only and may not be sold, redistributed or otherwise used for commercial purposes. The above information is an  only. It is not intended as medical advice for individual conditions or treatments. Talk to your doctor, nurse or pharmacist before following any medical regimen to see if it is safe and effective for you.

## 2023-07-25 LAB
ALBUMIN SERPL-MCNC: 4.4 G/DL (ref 3.9–4.9)
ALBUMIN/GLOB SERPL: 1.5 {RATIO} (ref 1.2–2.2)
ALP SERPL-CCNC: 193 IU/L (ref 44–121)
ALT SERPL-CCNC: 71 IU/L (ref 0–32)
AST SERPL-CCNC: 48 IU/L (ref 0–40)
B BURGDOR AB SER-IMP: ABNORMAL
B BURGDOR IGG SERPL QL IA: POSITIVE
B BURGDOR IGG+IGM SER QL IA: POSITIVE
B BURGDOR IGM SERPL QL IA: POSITIVE
BASOPHILS # BLD AUTO: 0.1 X10E3/UL (ref 0–0.2)
BASOPHILS NFR BLD AUTO: 1 %
BILIRUB SERPL-MCNC: 0.3 MG/DL (ref 0–1.2)
BUN SERPL-MCNC: 14 MG/DL (ref 8–27)
BUN/CREAT SERPL: 17 (ref 12–28)
CALCIUM SERPL-MCNC: 9.5 MG/DL (ref 8.7–10.3)
CHLORIDE SERPL-SCNC: 100 MMOL/L (ref 96–106)
CHOLEST SERPL-MCNC: 187 MG/DL (ref 100–199)
CO2 SERPL-SCNC: 23 MMOL/L (ref 20–29)
CREAT SERPL-MCNC: 0.84 MG/DL (ref 0.57–1)
EGFR: 79 ML/MIN/1.73
EOSINOPHIL # BLD AUTO: 0.2 X10E3/UL (ref 0–0.4)
EOSINOPHIL NFR BLD AUTO: 2 %
ERYTHROCYTE [DISTWIDTH] IN BLOOD BY AUTOMATED COUNT: 12.5 % (ref 11.7–15.4)
GLOBULIN SER-MCNC: 2.9 G/DL (ref 1.5–4.5)
GLUCOSE SERPL-MCNC: 98 MG/DL (ref 70–99)
HBA1C MFR BLD: 5.8 % (ref 4.8–5.6)
HCT VFR BLD AUTO: 36.5 % (ref 34–46.6)
HDLC SERPL-MCNC: 48 MG/DL
HGB BLD-MCNC: 12.3 G/DL (ref 11.1–15.9)
IMM GRANULOCYTES # BLD: 0 X10E3/UL (ref 0–0.1)
IMM GRANULOCYTES NFR BLD: 0 %
LDLC SERPL CALC-MCNC: 110 MG/DL (ref 0–99)
LYMPHOCYTES # BLD AUTO: 1.2 X10E3/UL (ref 0.7–3.1)
LYMPHOCYTES NFR BLD AUTO: 18 %
MCH RBC QN AUTO: 29.5 PG (ref 26.6–33)
MCHC RBC AUTO-ENTMCNC: 33.7 G/DL (ref 31.5–35.7)
MCV RBC AUTO: 88 FL (ref 79–97)
MICRODELETION SYND BLD/T FISH: NORMAL
MONOCYTES # BLD AUTO: 0.6 X10E3/UL (ref 0.1–0.9)
MONOCYTES NFR BLD AUTO: 9 %
NEUTROPHILS # BLD AUTO: 4.7 X10E3/UL (ref 1.4–7)
NEUTROPHILS NFR BLD AUTO: 70 %
PLATELET # BLD AUTO: 373 X10E3/UL (ref 150–450)
POTASSIUM SERPL-SCNC: 4.4 MMOL/L (ref 3.5–5.2)
PROT SERPL-MCNC: 7.3 G/DL (ref 6–8.5)
RBC # BLD AUTO: 4.17 X10E6/UL (ref 3.77–5.28)
SODIUM SERPL-SCNC: 139 MMOL/L (ref 134–144)
TRIGL SERPL-MCNC: 168 MG/DL (ref 0–149)
WBC # BLD AUTO: 6.8 X10E3/UL (ref 3.4–10.8)

## 2023-07-26 ENCOUNTER — TELEPHONE (OUTPATIENT)
Dept: FAMILY MEDICINE CLINIC | Facility: CLINIC | Age: 63
End: 2023-07-26

## 2023-07-26 DIAGNOSIS — E06.3 HASHIMOTO'S THYROIDITIS: ICD-10-CM

## 2023-07-26 DIAGNOSIS — Z12.31 SCREENING MAMMOGRAM, ENCOUNTER FOR: ICD-10-CM

## 2023-07-26 DIAGNOSIS — R74.8 ELEVATED LIVER ENZYMES: Primary | ICD-10-CM

## 2023-07-26 NOTE — TELEPHONE ENCOUNTER
----- Message from Roselyn Garza sent at 7/26/2023  8:33 AM EDT -----  Regarding: FW: Added blood work  Contact: [phone]    ----- Message -----  From: José Antonio Tamez  Sent: 7/26/2023   3:31 AM EDT  To: [facility]  Subject: Added blood work                                 Hi Dr Radha Piña,    I see that my Lyme test came back positive :Bruno Cramer put me on Doxycycline for 14 days so I assume that should take care of that. I'm very concerned about the Alk phos, AST and ALT high numbers. Is it possible that the Lyme disease could cause elevated numbers? An added issue I have suddenly developed is an aching pain in my right shoulder blade area. I am up at 3:30am due to the discomfort. I thought it might be from having spent time on Sunday holding my almost 30 lbs grandson. However, it went away for a while yesterday but came back in the evening. Letting you know in case there is significance. Please call me at your earliest convenience to discuss. Thanks!   Keisha Vu

## 2023-07-26 NOTE — TELEPHONE ENCOUNTER
7/26/2023 1:55 PM called Lucita Pak regarding her  lab results    We discussed her elevated liver enzymes  Suspect it could be related to her acute lyme's     Will have her repeat the liver enzymes a week after completing the antibiotic course for Lyme    Burak Nicole DO

## 2023-08-12 ENCOUNTER — OFFICE VISIT (OUTPATIENT)
Dept: FAMILY MEDICINE CLINIC | Facility: CLINIC | Age: 63
End: 2023-08-12
Payer: COMMERCIAL

## 2023-08-12 VITALS
HEIGHT: 65 IN | BODY MASS INDEX: 29.29 KG/M2 | OXYGEN SATURATION: 98 % | TEMPERATURE: 97.3 F | DIASTOLIC BLOOD PRESSURE: 72 MMHG | WEIGHT: 175.8 LBS | HEART RATE: 68 BPM | SYSTOLIC BLOOD PRESSURE: 118 MMHG | RESPIRATION RATE: 20 BRPM

## 2023-08-12 DIAGNOSIS — U07.1 COVID-19 VIRUS INFECTION: Primary | ICD-10-CM

## 2023-08-12 PROCEDURE — 99213 OFFICE O/P EST LOW 20 MIN: CPT | Performed by: FAMILY MEDICINE

## 2023-08-12 RX ORDER — BENZONATATE 200 MG/1
200 CAPSULE ORAL 3 TIMES DAILY PRN
Qty: 30 CAPSULE | Refills: 0 | Status: SHIPPED | OUTPATIENT
Start: 2023-08-12

## 2023-08-12 NOTE — PROGRESS NOTES
Assessment/Plan:    1. COVID-19 virus infection  -     benzonatate (TESSALON) 200 MG capsule; Take 1 capsule (200 mg total) by mouth 3 (three) times a day as needed for cough    Pt advised on vit c,d and zinc  Advised on self isolation  Symptomatic meds          There are no Patient Instructions on file for this visit. No follow-ups on file. Subjective:      Patient ID: Jim Mcdaniel is a 58 y.o. female. Chief Complaint   Patient presents with   • COVID-19     Patient tested positive for covid this morning. Symptoms started 4 days ago. Carloz Junior CMA    • Sore Throat   • Fatigue   • Cough   • Generalized Body Aches   • Chills   • Shortness of Breath   • Nasal Congestion   • Sinus Problem   • Headache       Pt is here for same day appt for covid  Pt states she started with a sore throat wed  Started with congestion on Thursday  Full blown cold yesterday   Chills  Body achs  No HA  Taste is so so  Feels like a bad cold. The following portions of the patient's history were reviewed and updated as appropriate: allergies, current medications, past family history, past medical history, past social history, past surgical history and problem list.    Review of Systems   Constitutional: Positive for chills and fatigue. HENT: Positive for congestion. Respiratory: Positive for cough.           Current Outpatient Medications   Medication Sig Dispense Refill   • benzonatate (TESSALON) 200 MG capsule Take 1 capsule (200 mg total) by mouth 3 (three) times a day as needed for cough 30 capsule 0   • Cozaar 50 MG tablet Take 1 tablet (50 mg total) by mouth daily Unable to tolerate generic, not effective 90 tablet 0   • EPINEPHrine (EPIPEN) 0.3 mg/0.3 mL SOAJ Inject 0.3 mL (0.3 mg total) into a muscle once for 1 dose 0.6 mL 0   • Lumigan 0.01 % ophthalmic drops Administer 1 drop to both eyes daily at bedtime     • Multiple Vitamins-Minerals (multivitamin with minerals) tablet Take 1 tablet by mouth daily • omeprazole (PriLOSEC) 40 MG capsule TAKE 1 CAPSULE DAILY 90 capsule 0   • valACYclovir (VALTREX) 1,000 mg tablet Take 2 at onset of cold sore and repeat in 12 hours. 20 tablet 3     No current facility-administered medications for this visit. Objective:    /72   Pulse 68   Temp (!) 97.3 °F (36.3 °C)   Resp 20   Ht 5' 5" (1.651 m)   Wt 79.7 kg (175 lb 12.8 oz)   SpO2 98%   BMI 29.25 kg/m²        Physical Exam  Constitutional:       Appearance: She is ill-appearing. HENT:      Right Ear: Tympanic membrane is erythematous.                 Sujata Garduno DO

## 2023-09-01 ENCOUNTER — HOSPITAL ENCOUNTER (OUTPATIENT)
Dept: RADIOLOGY | Facility: HOSPITAL | Age: 63
Discharge: HOME | End: 2023-09-01
Payer: COMMERCIAL

## 2023-09-01 DIAGNOSIS — Z12.31 SCREENING MAMMOGRAM, ENCOUNTER FOR: ICD-10-CM

## 2023-09-01 LAB
ALBUMIN SERPL-MCNC: 4.7 G/DL (ref 3.9–4.9)
ALP SERPL-CCNC: 106 IU/L (ref 44–121)
ALT SERPL-CCNC: 16 IU/L (ref 0–32)
AST SERPL-CCNC: 22 IU/L (ref 0–40)
BILIRUB DIRECT SERPL-MCNC: <0.1 MG/DL (ref 0–0.4)
BILIRUB SERPL-MCNC: 0.2 MG/DL (ref 0–1.2)
PROT SERPL-MCNC: 7.2 G/DL (ref 6–8.5)

## 2023-09-01 PROCEDURE — 77063 BREAST TOMOSYNTHESIS BI: CPT

## 2023-09-01 PROCEDURE — 77067 SCR MAMMO BI INCL CAD: CPT

## 2023-09-02 LAB
T4 FREE SERPL-MCNC: 1.15 NG/DL (ref 0.82–1.77)
TSH SERPL DL<=0.005 MIU/L-ACNC: 3.68 UIU/ML (ref 0.45–4.5)

## 2023-09-26 ENCOUNTER — TELEPHONE (OUTPATIENT)
Age: 63
End: 2023-09-26

## 2023-09-26 NOTE — TELEPHONE ENCOUNTER
Spoke with patient, patient is looking for a recommendation for a dermatologist with in [place]. Please advise.

## 2023-09-29 DIAGNOSIS — I10 BENIGN ESSENTIAL HYPERTENSION: ICD-10-CM

## 2023-09-29 RX ORDER — LOSARTAN POTASSIUM 50 MG
50 TABLET ORAL DAILY
Qty: 90 TABLET | Refills: 0 | Status: SHIPPED | OUTPATIENT
Start: 2023-09-29

## 2023-10-13 ENCOUNTER — OFFICE VISIT (OUTPATIENT)
Dept: OBGYN CLINIC | Facility: CLINIC | Age: 63
End: 2023-10-13
Payer: COMMERCIAL

## 2023-10-13 VITALS
HEART RATE: 68 BPM | DIASTOLIC BLOOD PRESSURE: 84 MMHG | HEIGHT: 65 IN | WEIGHT: 176.4 LBS | SYSTOLIC BLOOD PRESSURE: 133 MMHG | BODY MASS INDEX: 29.39 KG/M2

## 2023-10-13 DIAGNOSIS — M75.82 ROTATOR CUFF TENDINITIS, LEFT: Primary | ICD-10-CM

## 2023-10-13 PROCEDURE — 20610 DRAIN/INJ JOINT/BURSA W/O US: CPT

## 2023-10-13 PROCEDURE — 99213 OFFICE O/P EST LOW 20 MIN: CPT

## 2023-10-13 RX ADMIN — TRIAMCINOLONE ACETONIDE 40 MG: 40 INJECTION, SUSPENSION INTRA-ARTICULAR; INTRAMUSCULAR at 14:15

## 2023-10-13 RX ADMIN — BUPIVACAINE HYDROCHLORIDE 3.5 ML: 5 INJECTION, SOLUTION PERINEURAL at 14:15

## 2023-10-13 NOTE — PROGRESS NOTES
Orthopedic Sports Medicine New Patient Visit     Assesment:   61 y.o. female with left rotator cuff tendinitis    Plan:    Upon examination today, the patient has well-maintained shoulder motion and strength such that I believe her rotator cuff function remains intact. The patient has completed 12 weeks of formal PT with incomplete resolution of symptoms, so an MRI to assess for any internal derangement would be reasonable at this time. The patient expressed interest in continuing non-operative interventions at this time, including a subacromial steroid injection and restarting exercises she learned through PT. We discussed that the purpose of the steroid injection is to provide pain relief, but that it does not offer any significant healing properties. She tolerated the procedure very well and post-injection instructions were provided. The patient can also use ice/heat and OTC medications as needed for pain. She can continue activity as tolerated, and we encouraged her to remain active without any specific limitations. The patient will plan to follow up as needed if her patient's symptoms persist or worsen following the injection today. We can consider need for further investigation of the shoulder, including MRI, at that time if necessary. Chief Complaint   Patient presents with    Left Shoulder - Follow-up       History of Present Illness: The patient is a 61 y.o., right hand dominant, female, presenting for initial evaluation of atraumatic left shoulder pain localized to the lateral aspect of the shoulder for the past 8 months. The patient presented to her PCP at that time and x-rays were obtained, showing moderate AC joint DJD. She states that pain occurs with trying to put on a bra, carrying her grandson on the left side, and sleeping directly on the shoulder. She denies weakness, instability, limited motion, neck pain, or numbness/tingling.  The patient completed 12 weeks of formal PT about 3-4 months ago and states this did help her symptomatically, though incompletely. The patient has not received a steroid injection into the shoulder so far. She uses Advil as needed, especially at nighttime, but this is very infrequently. Affected shoulder SANE score: 50    Shoulder Surgical History:  None    Past Medical, Social and Family History:  Past Medical History:   Diagnosis Date    Baker's cyst, ruptured     Last assessed - 11/11/14    Closed fracture of metatarsal bone 01/13/2009    Exposure to rabies 06/19/2008    Hypertension     Precordial chest pain     Last assessed - 5/29/13     Past Surgical History:   Procedure Laterality Date    COLONOSCOPY      FOOT SURGERY Left     WRIST SURGERY      ganglion     Allergies   Allergen Reactions    Cephalexin Anaphylaxis    Other Hives     PRESERVATIVES      Current Outpatient Medications on File Prior to Visit   Medication Sig Dispense Refill    benzonatate (TESSALON) 200 MG capsule Take 1 capsule (200 mg total) by mouth 3 (three) times a day as needed for cough 30 capsule 0    Cozaar 50 MG tablet TAKE 1 TABLET DAILY 90 tablet 0    EPINEPHrine (EPIPEN) 0.3 mg/0.3 mL SOAJ Inject 0.3 mL (0.3 mg total) into a muscle once for 1 dose 0.6 mL 0    Lumigan 0.01 % ophthalmic drops Administer 1 drop to both eyes daily at bedtime      Multiple Vitamins-Minerals (multivitamin with minerals) tablet Take 1 tablet by mouth daily      omeprazole (PriLOSEC) 40 MG capsule TAKE 1 CAPSULE DAILY 90 capsule 0    valACYclovir (VALTREX) 1,000 mg tablet Take 2 at onset of cold sore and repeat in 12 hours. 20 tablet 3     No current facility-administered medications on file prior to visit.      Social History     Socioeconomic History    Marital status:      Spouse name: Not on file    Number of children: Not on file    Years of education: Not on file    Highest education level: Not on file   Occupational History    Not on file   Tobacco Use    Smoking status: Former     Packs/day: 0.25     Years: 7.00     Total pack years: 1.75     Types: Cigarettes     Start date: 6/15/1972     Quit date: 6/15/1979     Years since quittin.3    Smokeless tobacco: Never   Vaping Use    Vaping Use: Never used   Substance and Sexual Activity    Alcohol use: Yes     Comment: rare    Drug use: Never    Sexual activity: Yes   Other Topics Concern    Not on file   Social History Narrative    Not on file     Social Determinants of Health     Financial Resource Strain: Not on file   Food Insecurity: Not on file   Transportation Needs: Not on file   Physical Activity: Not on file   Stress: Not on file   Social Connections: Not on file   Intimate Partner Violence: Not on file   Housing Stability: Not on file         I have reviewed the past medical, surgical, social and family history, medications and allergies as documented in the EMR. Review of systems: ROS is negative other than that noted in the HPI. Constitutional: Negative for fatigue and fever. HENT: Negative for sore throat. Respiratory: Negative for shortness of breath. Cardiovascular: Negative for chest pain. Gastrointestinal: Negative for abdominal pain. Endocrine: Negative for cold intolerance and heat intolerance. Genitourinary: Negative for flank pain. Musculoskeletal: Negative for back pain. Skin: Negative for rash. Allergic/Immunologic: Negative for immunocompromised state. Neurological: Negative for dizziness. Psychiatric/Behavioral: Negative for agitation. Physical Exam:    Blood pressure 133/84, pulse 68, height 5' 5" (1.651 m), weight 80 kg (176 lb 6.4 oz).     General/Constitutional: NAD, well developed, well nourished  HENT: Normocephalic, atraumatic  CV: Intact distal pulses, regular rate  Resp: No respiratory distress or labored breathing  Abdomen: soft, nondistended, non tender   Lymphatic: No lymphadenopathy palpated  Neuro: Alert and Oriented x 3, no focal deficits  Psych: Normal mood, normal affect  Skin: Warm, dry, no rashes, no erythema      Shoulder Exam:      Inspection: No ecchymosis, edema, or deformity. No visualized wounds or skin lesions   Palpation: no AC joint or bicipital groove tenderness  Active Motion:       FF: 170, symmetric        ER: 60, symmetric        IR: T8, symmetric - painful on the left side  Strength: 5/5 empty can, 5/5 ER,  5/5 IR   Sensory - SILT in the Radial / Ulnar / Median / Axillary nerve distributions  Motor - AIN / PIN / Radial / Ulnar / Median / Axillary motor nerves in tact  Palpable Radial pulse  Cap refill <2secs in all digits      5/5 Belly Press      Imaging    I reviewed and interpreted X-rays from 3/31/2023 of the left shoulder which show no acute osseous abnormalities. Moderate AC and mild GH joint degenerative changes. Humerus is well-centered on the glenoid. Large joint arthrocentesis: L subacromial bursa  Universal Protocol:  Consent: Verbal consent obtained. Risks and benefits: risks, benefits and alternatives were discussed  Consent given by: patient  Timeout called at: 10/13/2023 2:58 PM.  Patient understanding: patient states understanding of the procedure being performed  Patient consent: the patient's understanding of the procedure matches consent given  Site marked: the operative site was marked  Radiology Images displayed and confirmed.  If images not available, report reviewed: imaging studies available  Supporting Documentation  Indications: pain   Procedure Details  Location: shoulder - L subacromial bursa  Preparation: Patient was prepped and draped in the usual sterile fashion  Needle size: 22 G  Ultrasound guidance: no  Approach: posterior  Medications administered: 40 mg triamcinolone acetonide 40 mg/mL; 3.5 mL bupivacaine 0.5 %    Patient tolerance: patient tolerated the procedure well with no immediate complications  Dressing:  Sterile dressing applied

## 2023-10-16 RX ORDER — TRIAMCINOLONE ACETONIDE 40 MG/ML
40 INJECTION, SUSPENSION INTRA-ARTICULAR; INTRAMUSCULAR
Status: COMPLETED | OUTPATIENT
Start: 2023-10-13 | End: 2023-10-13

## 2023-10-16 RX ORDER — BUPIVACAINE HYDROCHLORIDE 5 MG/ML
3.5 INJECTION, SOLUTION PERINEURAL
Status: COMPLETED | OUTPATIENT
Start: 2023-10-13 | End: 2023-10-13

## 2023-10-23 ENCOUNTER — OFFICE VISIT (OUTPATIENT)
Dept: FAMILY MEDICINE CLINIC | Facility: CLINIC | Age: 63
End: 2023-10-23
Payer: COMMERCIAL

## 2023-10-23 VITALS
DIASTOLIC BLOOD PRESSURE: 70 MMHG | RESPIRATION RATE: 18 BRPM | BODY MASS INDEX: 28.82 KG/M2 | HEART RATE: 73 BPM | TEMPERATURE: 97.1 F | HEIGHT: 65 IN | SYSTOLIC BLOOD PRESSURE: 118 MMHG | WEIGHT: 173 LBS

## 2023-10-23 DIAGNOSIS — I10 BENIGN ESSENTIAL HYPERTENSION: ICD-10-CM

## 2023-10-23 DIAGNOSIS — E04.2 MULTIPLE THYROID NODULES: ICD-10-CM

## 2023-10-23 DIAGNOSIS — Z00.00 ANNUAL PHYSICAL EXAM: Primary | ICD-10-CM

## 2023-10-23 DIAGNOSIS — E06.3 HASHIMOTO'S THYROIDITIS: ICD-10-CM

## 2023-10-23 DIAGNOSIS — Z23 NEED FOR INFLUENZA VACCINATION: ICD-10-CM

## 2023-10-23 DIAGNOSIS — Z12.4 CERVICAL CANCER SCREENING: ICD-10-CM

## 2023-10-23 DIAGNOSIS — R73.09 HEMOGLOBIN A1C ABOVE REFERENCE RANGE: ICD-10-CM

## 2023-10-23 PROCEDURE — 90686 IIV4 VACC NO PRSV 0.5 ML IM: CPT

## 2023-10-23 PROCEDURE — 90471 IMMUNIZATION ADMIN: CPT

## 2023-10-23 PROCEDURE — 99396 PREV VISIT EST AGE 40-64: CPT | Performed by: FAMILY MEDICINE

## 2023-10-23 NOTE — PROGRESS NOTES
[address]    NAME: Loly Santana  AGE: 61 y.o. SEX: female  : 1960     DATE: 10/23/2023     Assessment and Plan:     Problem List Items Addressed This Visit     Benign essential hypertension    Relevant Orders    CBC    Comprehensive metabolic panel    Lipid Panel with Direct LDL reflex    Hashimoto's thyroiditis    Relevant Orders    T4, free    TSH, 3rd generation    Hemoglobin A1c above reference range    Relevant Orders    Hemoglobin A1C    Multiple thyroid nodules    Relevant Orders    US thyroid    T4, free    TSH, 3rd generation   Other Visit Diagnoses     Annual physical exam    -  Primary    Need for influenza vaccination        Relevant Orders    influenza vaccine, quadrivalent, 0.5 mL, preservative-free, for adult and pediatric patients 6 mos+ (AFLURIA, FLUARIX, FLULAVAL, FLUZONE) (Completed)    Cervical cancer screening        Relevant Orders    IGP, Aptima HPV            Immunizations and preventive care screenings were discussed with patient today. Appropriate education was printed on patient's after visit summary. Counseling:  Dental Health: discussed importance of regular tooth brushing, flossing, and dental visits. Sexual health: discussed sexually transmitted diseases, partner selection, use of condoms, avoidance of unintended pregnancy, and contraceptive alternatives. Exercise: the importance of regular exercise/physical activity was discussed. Recommend exercise 3-5 times per week for at least 30 minutes. Return in about 6 months (around 2024) for Next scheduled follow up. Chief Complaint:     Chief Complaint   Patient presents with   • Physical Exam     HK CMA       History of Present Illness:     Adult Annual Physical   Patient here for a comprehensive physical exam. The patient reports  stress. Her daughter is getting a divorce .     Diet and Physical Activity  Diet/Nutrition: well balanced diet. Exercise: no formal exercise. Depression Screening  PHQ-2/9 Depression Screening         General Health  Sleep: sleeps well. Hearing: significantly decreased - right. Vision: no vision problems. Dental: regular dental visits. /GYN Health  Patient is: postmenopausal           Review of Systems:     Review of Systems   Constitutional: Negative. Respiratory: Negative. Cardiovascular: Negative.        Past Medical History:     Past Medical History:   Diagnosis Date   • Baker's cyst, ruptured     Last assessed - 14   • Closed fracture of metatarsal bone 2009   • Exposure to rabies 2008   • Hypertension    • Precordial chest pain     Last assessed - 13      Past Surgical History:     Past Surgical History:   Procedure Laterality Date   • COLONOSCOPY     • FOOT SURGERY Left    • WRIST SURGERY      ganglion      Social History:     Social History     Socioeconomic History   • Marital status:      Spouse name: None   • Number of children: None   • Years of education: None   • Highest education level: None   Occupational History   • None   Tobacco Use   • Smoking status: Former     Packs/day: 0.25     Years: 7.00     Total pack years: 1.75     Types: Cigarettes     Start date: 6/15/1972     Quit date: 6/15/1979     Years since quittin.3   • Smokeless tobacco: Never   Vaping Use   • Vaping Use: Never used   Substance and Sexual Activity   • Alcohol use: Yes     Comment: rare   • Drug use: Never   • Sexual activity: Yes   Other Topics Concern   • None   Social History Narrative   • None     Social Determinants of Health     Financial Resource Strain: Not on file   Food Insecurity: Not on file   Transportation Needs: Not on file   Physical Activity: Not on file   Stress: Not on file   Social Connections: Not on file   Intimate Partner Violence: Not on file   Housing Stability: Not on file      Family History:     Family History   Problem Relation Age of Onset   • Glaucoma Father    • Hypertension Father    • Hyperlipidemia Father    • Kidney cancer Father    • Colon cancer Paternal Aunt    • Ovarian cancer Paternal Aunt    • Hypertension Family    • Hyperlipidemia Mother    • Heart attack Mother    • Cancer Paternal Grandmother    • Cancer Paternal Grandfather    • Esophageal cancer Paternal Uncle    • Breast cancer Neg Hx    • Diabetes Neg Hx    • Mental illness Neg Hx       Current Medications:     Current Outpatient Medications   Medication Sig Dispense Refill   • Cozaar 50 MG tablet TAKE 1 TABLET DAILY 90 tablet 0   • Lumigan 0.01 % ophthalmic drops Administer 1 drop to both eyes daily at bedtime     • Multiple Vitamins-Minerals (multivitamin with minerals) tablet Take 1 tablet by mouth daily     • omeprazole (PriLOSEC) 40 MG capsule TAKE 1 CAPSULE DAILY 90 capsule 0   • benzonatate (TESSALON) 200 MG capsule Take 1 capsule (200 mg total) by mouth 3 (three) times a day as needed for cough (Patient not taking: Reported on 10/23/2023) 30 capsule 0   • EPINEPHrine (EPIPEN) 0.3 mg/0.3 mL SOAJ Inject 0.3 mL (0.3 mg total) into a muscle once for 1 dose 0.6 mL 0   • valACYclovir (VALTREX) 1,000 mg tablet Take 2 at onset of cold sore and repeat in 12 hours. (Patient not taking: Reported on 10/23/2023) 20 tablet 3     No current facility-administered medications for this visit. Allergies: Allergies   Allergen Reactions   • Cephalexin Anaphylaxis   • Other Hives     PRESERVATIVES       Physical Exam:     /70   Pulse 73   Temp (!) 97.1 °F (36.2 °C)   Resp 18   Ht 5' 5" (1.651 m)   Wt 78.5 kg (173 lb)   BMI 28.79 kg/m²     Physical Exam  Vitals and nursing note reviewed. Exam conducted with a chaperone present. Constitutional:       Appearance: She is well-developed. HENT:      Head: Normocephalic and atraumatic.       Right Ear: External ear normal.      Left Ear: External ear normal.      Nose: Nose normal.   Cardiovascular:      Rate and Rhythm: Normal rate and regular rhythm. Heart sounds: Normal heart sounds. No murmur heard. No friction rub. Pulmonary:      Effort: No respiratory distress. Breath sounds: Normal breath sounds. No wheezing or rales. Chest:   Breasts:     Right: Normal. No mass, skin change or tenderness. Left: Normal. No mass, skin change or tenderness. Abdominal:      Palpations: Abdomen is soft. Tenderness: There is no abdominal tenderness. Genitourinary:     Vagina: Normal.      Cervix: Normal.      Uterus: Normal.       Adnexa: Right adnexa normal and left adnexa normal.   Musculoskeletal:      Right lower leg: No edema. Left lower leg: No edema. Neurological:      Mental Status: She is oriented to person, place, and time. Cranial Nerves: No cranial nerve deficit.         Vision Screening    Right eye Left eye Both eyes   Without correction      With correction 20/25 20/20 20/20       [facility], [address]

## 2023-10-23 NOTE — PATIENT INSTRUCTIONS
Wellness Visit for Adults   AMBULATORY CARE:   A wellness visit  is when you see your healthcare provider to get screened for health problems. Your healthcare provider will also give you advice on how to stay healthy. Write down your questions so you remember to ask them. Ask your healthcare provider how often you should have a wellness visit. What happens at a wellness visit:  Your healthcare provider will ask about your health, and your family history of health problems. This includes high blood pressure, heart disease, and cancer. He or she will ask if you have symptoms that concern you, if you smoke, and about your mood. You may also be asked about your intake of medicines, supplements, food, and alcohol. Any of the following may be done: Your weight  will be checked. Your height may also be checked so your body mass index (BMI) can be calculated. Your BMI shows if you are at a healthy weight. Your blood pressure  and heart rate will be checked. Your temperature may also be checked. Blood and urine tests  may be done. Blood tests may be done to check your cholesterol levels. Abnormal cholesterol levels increase your risk for heart disease and stroke. You may also need a blood or urine test to check for diabetes if you are at increased risk. Urine tests may be done to look for signs of an infection or kidney disease. A physical exam  includes checking your heartbeat and lungs with a stethoscope. Your healthcare provider may also check your skin to look for sun damage. Screening tests  may be recommended. A screening test is done to check for diseases that may not cause symptoms. The screening tests you may need depend on your age, gender, family history, and lifestyle habits. For example, colorectal screening may be recommended if you are 48years old or older. Screening tests you need if you are a woman:   A Pap smear  is used to screen for cervical cancer.  Pap smears are usually done every 3 to 5 years depending on your age. You may need them more often if you have had abnormal Pap smear test results in the past. Ask your healthcare provider how often you should have a Pap smear. A mammogram  is an x-ray of your breasts to screen for breast cancer. Experts recommend mammograms every 2 years starting at age 48 years. You may need a mammogram at age 52 years or younger if you have an increased risk for breast cancer. Talk to your healthcare provider about when you should start having mammograms and how often you need them. Vaccines you may need:   Get an influenza vaccine  every year. The influenza vaccine protects you from the flu. Several types of viruses cause the flu. The viruses change over time, so new vaccines are made each year. Get a tetanus-diphtheria (Td) booster vaccine  every 10 years. This vaccine protects you against tetanus and diphtheria. Tetanus is a severe infection that may cause painful muscle spasms and lockjaw. Diphtheria is a severe bacterial infection that causes a thick covering in the back of your mouth and throat. Get a human papillomavirus (HPV) vaccine  if you are female and aged 23 to 32 or male 23 to 24 and never received it. This vaccine protects you from HPV infection. HPV is the most common infection spread by sexual contact. HPV may also cause vaginal, penile, and anal cancers. Get a pneumococcal vaccine  if you are aged 72 years or older. The pneumococcal vaccine is an injection given to protect you from pneumococcal disease. Pneumococcal disease is an infection caused by pneumococcal bacteria. The infection may cause pneumonia, meningitis, or an ear infection. Get a shingles vaccine  if you are 60 or older, even if you have had shingles before. The shingles vaccine is an injection to protect you from the varicella-zoster virus. This is the same virus that causes chickenpox.  Shingles is a painful rash that develops in people who had chickenpox or have been exposed to the virus. How to eat healthy:  My Plate is a model for planning healthy meals. It shows the types and amounts of foods that should go on your plate. Fruits and vegetables make up about half of your plate, and grains and protein make up the other half. A serving of dairy is included on the side of your plate. The amount of calories and serving sizes you need depends on your age, gender, weight, and height. Examples of healthy foods are listed below:  Eat a variety of vegetables  such as dark green, red, and orange vegetables. You can also include canned vegetables low in sodium (salt) and frozen vegetables without added butter or sauces. Eat a variety of fresh fruits , canned fruit in 100% juice, frozen fruit, and dried fruit. Include whole grains. At least half of the grains you eat should be whole grains. Examples include whole-wheat bread, wheat pasta, brown rice, and whole-grain cereals such as oatmeal.    Eat a variety of protein foods such as seafood (fish and shellfish), lean meat, and poultry without skin (turkey and chicken). Examples of lean meats include pork leg, shoulder, or tenderloin, and beef round, sirloin, tenderloin, and extra lean ground beef. Other protein foods include eggs and egg substitutes, beans, peas, soy products, nuts, and seeds. Choose low-fat dairy products such as skim or 1% milk or low-fat yogurt, cheese, and cottage cheese. Limit unhealthy fats  such as butter, hard margarine, and shortening. Exercise:  Exercise at least 30 minutes per day on most days of the week. Some examples of exercise include walking, biking, dancing, and swimming. You can also fit in more physical activity by taking the stairs instead of the elevator or parking farther away from stores. Include muscle strengthening activities 2 days each week. Regular exercise provides many health benefits.  It helps you manage your weight, and decreases your risk for type 2 diabetes, heart disease, stroke, and high blood pressure. Exercise can also help improve your mood. Ask your healthcare provider about the best exercise plan for you. General health and safety guidelines:   Do not smoke. Nicotine and other chemicals in cigarettes and cigars can cause lung damage. Ask your healthcare provider for information if you currently smoke and need help to quit. E-cigarettes or smokeless tobacco still contain nicotine. Talk to your healthcare provider before you use these products. Limit alcohol. A drink of alcohol is 12 ounces of beer, 5 ounces of wine, or 1½ ounces of liquor. Lose weight, if needed. Being overweight increases your risk of certain health conditions. These include heart disease, high blood pressure, type 2 diabetes, and certain types of cancer. Protect your skin. Do not sunbathe or use tanning beds. Use sunscreen with a SPF 15 or higher. Apply sunscreen at least 15 minutes before you go outside. Reapply sunscreen every 2 hours. Wear protective clothing, hats, and sunglasses when you are outside. Drive safely. Always wear your seatbelt. Make sure everyone in your car wears a seatbelt. A seatbelt can save your life if you are in an accident. Do not use your cell phone when you are driving. This could distract you and cause an accident. Pull over if you need to make a call or send a text message. Practice safe sex. Use latex condoms if are sexually active and have more than one partner. Your healthcare provider may recommend screening tests for sexually transmitted infections (STIs). Wear helmets, lifejackets, and protective gear. Always wear a helmet when you ride a bike or motorcycle, go skiing, or play sports that could cause a head injury. Wear protective equipment when you play sports. Wear a lifejacket when you are on a boat or doing water sports.     © Copyright [employer]leton 2023 Information is for End User's use only and may not be sold, redistributed or otherwise used for commercial purposes. The above information is an  only. It is not intended as medical advice for individual conditions or treatments. Talk to your doctor, nurse or pharmacist before following any medical regimen to see if it is safe and effective for you.

## 2023-10-25 LAB — MICRODELETION SYND BLD/T FISH: NORMAL

## 2023-10-27 LAB
CYTOLOGIST CVX/VAG CYTO: NORMAL
DX ICD CODE: NORMAL
HPV I/H RISK 4 DNA CVX QL PROBE+SIG AMP: NEGATIVE
Lab: NORMAL
OTHER STN SPEC: NORMAL
PATH REPORT.FINAL DX SPEC: NORMAL
SL AMB NOTE:: NORMAL
SL AMB SPECIMEN ADEQUACY: NORMAL
SL AMB TEST METHODOLOGY: NORMAL

## 2023-11-01 ENCOUNTER — OFFICE VISIT (OUTPATIENT)
Dept: FAMILY MEDICINE CLINIC | Facility: CLINIC | Age: 63
End: 2023-11-01
Payer: COMMERCIAL

## 2023-11-01 VITALS
DIASTOLIC BLOOD PRESSURE: 84 MMHG | RESPIRATION RATE: 18 BRPM | SYSTOLIC BLOOD PRESSURE: 122 MMHG | BODY MASS INDEX: 28.96 KG/M2 | HEART RATE: 72 BPM | TEMPERATURE: 99 F | WEIGHT: 174 LBS

## 2023-11-01 DIAGNOSIS — I10 BENIGN ESSENTIAL HYPERTENSION: ICD-10-CM

## 2023-11-01 DIAGNOSIS — N20.0 RENAL CALCULI: ICD-10-CM

## 2023-11-01 DIAGNOSIS — E06.3 HASHIMOTO'S THYROIDITIS: ICD-10-CM

## 2023-11-01 DIAGNOSIS — N39.0 ACUTE UTI: Primary | ICD-10-CM

## 2023-11-01 LAB
SL AMB  POCT GLUCOSE, UA: ABNORMAL
SL AMB LEUKOCYTE ESTERASE,UA: ABNORMAL
SL AMB POCT BILIRUBIN,UA: ABNORMAL
SL AMB POCT BLOOD,UA: ABNORMAL
SL AMB POCT CLARITY,UA: ABNORMAL
SL AMB POCT COLOR,UA: YELLOW
SL AMB POCT KETONES,UA: ABNORMAL
SL AMB POCT NITRITE,UA: ABNORMAL
SL AMB POCT PH,UA: 7
SL AMB POCT SPECIFIC GRAVITY,UA: 1.02
SL AMB POCT URINE PROTEIN: 100
SL AMB POCT UROBILINOGEN: 0.2

## 2023-11-01 PROCEDURE — 81003 URINALYSIS AUTO W/O SCOPE: CPT | Performed by: NURSE PRACTITIONER

## 2023-11-01 PROCEDURE — 99214 OFFICE O/P EST MOD 30 MIN: CPT | Performed by: NURSE PRACTITIONER

## 2023-11-01 RX ORDER — SULFAMETHOXAZOLE AND TRIMETHOPRIM 800; 160 MG/1; MG/1
1 TABLET ORAL EVERY 12 HOURS SCHEDULED
Qty: 10 TABLET | Refills: 0 | Status: SHIPPED | OUTPATIENT
Start: 2023-11-01 | End: 2023-11-06

## 2023-11-01 NOTE — PROGRESS NOTES
Assessment/Plan:    If culture negative, will consider CT for calculi. To call with any new or worsening symptoms in the interim    1. Acute UTI  -     POCT urine dip auto non-scope  -     Urine culture  -     sulfamethoxazole-trimethoprim (BACTRIM DS) 800-160 mg per tablet; Take 1 tablet by mouth every 12 (twelve) hours for 5 days    2. Benign essential hypertension  Assessment & Plan:  Stable      3. Renal calculi  Assessment & Plan:  Incidental finding on CT last year, no issues related to this       4. Hashimoto's thyroiditis  Assessment & Plan:  Labs monitored routinely        Recent Results (from the past 24 hour(s))   POCT urine dip auto non-scope    Collection Time: 11/01/23 11:08 AM   Result Value Ref Range     COLOR,UA yellow     CLARITY,UA cloudy     SPECIFIC GRAVITY,UA 1.020      PH,UA 7.0     LEUKOCYTE ESTERASE,UA trace     NITRITE,UA neg     GLUCOSE, UA ne     KETONES,UA neg     BILIRUBIN,UA neg     BLOOD,UA large     POCT URINE PROTEIN 100     SL AMB POCT UROBILINOGEN 0.2              There are no Patient Instructions on file for this visit. Return if symptoms worsen or fail to improve. Subjective:      Patient ID: Shante Deal is a 61 y.o. female. Chief Complaint   Patient presents with   • Urinary Tract Infection     Sas/cma       Yesterday, she developed urinary frequency. Got worse throughout the day, and was passing clots in her urine and did not feel like she was emptying her bladder. She had an intense pressure at the end of urination. This continued throughout the night, and finally felt like she was able to void entirely. Still passing some blood, but no clots. No flank pain, fevers, or nausea.            The following portions of the patient's history were reviewed and updated as appropriate: allergies, current medications, past family history, past medical history, past social history, past surgical history and problem list.    Review of Systems   Constitutional: Negative for chills and fever. Gastrointestinal: Negative. Genitourinary:  Positive for frequency and hematuria. Negative for flank pain. Current Outpatient Medications   Medication Sig Dispense Refill   • Cozaar 50 MG tablet TAKE 1 TABLET DAILY 90 tablet 0   • EPINEPHrine (EPIPEN) 0.3 mg/0.3 mL SOAJ Inject 0.3 mL (0.3 mg total) into a muscle once for 1 dose 0.6 mL 0   • Lumigan 0.01 % ophthalmic drops Administer 1 drop to both eyes daily at bedtime     • Multiple Vitamins-Minerals (multivitamin with minerals) tablet Take 1 tablet by mouth daily     • omeprazole (PriLOSEC) 40 MG capsule TAKE 1 CAPSULE DAILY 90 capsule 0   • sulfamethoxazole-trimethoprim (BACTRIM DS) 800-160 mg per tablet Take 1 tablet by mouth every 12 (twelve) hours for 5 days 10 tablet 0   • valACYclovir (VALTREX) 1,000 mg tablet Take 2 at onset of cold sore and repeat in 12 hours. 20 tablet 3     No current facility-administered medications for this visit. Objective:    /84   Pulse 72   Temp 99 °F (37.2 °C)   Resp 18   Wt 78.9 kg (174 lb)   BMI 28.96 kg/m²        Physical Exam  Vitals and nursing note reviewed. Constitutional:       Appearance: Normal appearance. She is well-developed. She is not ill-appearing or diaphoretic. HENT:      Head: Normocephalic and atraumatic. Eyes:      Conjunctiva/sclera: Conjunctivae normal.   Pulmonary:      Effort: Pulmonary effort is normal. No tachypnea, accessory muscle usage or respiratory distress. Musculoskeletal:      Cervical back: Normal range of motion. Skin:     Coloration: Skin is not pale. Neurological:      Mental Status: She is alert.    Psychiatric:         Mood and Affect: Mood normal.         Speech: Speech normal.         Behavior: Behavior normal.                KANDI Black

## 2023-11-03 ENCOUNTER — PATIENT MESSAGE (OUTPATIENT)
Dept: FAMILY MEDICINE CLINIC | Facility: CLINIC | Age: 63
End: 2023-11-03

## 2023-11-04 LAB
BACTERIA UR CULT: ABNORMAL
Lab: ABNORMAL
SL AMB ANTIMICROBIAL SUSCEPTIBILITY: ABNORMAL

## 2023-12-14 ENCOUNTER — OFFICE VISIT (OUTPATIENT)
Dept: FAMILY MEDICINE CLINIC | Facility: CLINIC | Age: 63
End: 2023-12-14
Payer: COMMERCIAL

## 2023-12-14 VITALS
BODY MASS INDEX: 29.32 KG/M2 | RESPIRATION RATE: 98 BRPM | SYSTOLIC BLOOD PRESSURE: 130 MMHG | HEART RATE: 87 BPM | HEIGHT: 65 IN | TEMPERATURE: 97.6 F | WEIGHT: 176 LBS | DIASTOLIC BLOOD PRESSURE: 76 MMHG

## 2023-12-14 DIAGNOSIS — J20.9 ACUTE BRONCHITIS, UNSPECIFIED ORGANISM: Primary | ICD-10-CM

## 2023-12-14 PROCEDURE — 99213 OFFICE O/P EST LOW 20 MIN: CPT | Performed by: INTERNAL MEDICINE

## 2023-12-14 RX ORDER — AZITHROMYCIN 250 MG/1
TABLET, FILM COATED ORAL
Qty: 6 TABLET | Refills: 0 | Status: SHIPPED | OUTPATIENT
Start: 2023-12-14 | End: 2023-12-18

## 2023-12-14 RX ORDER — DEXTROMETHORPHAN HYDROBROMIDE AND PROMETHAZINE HYDROCHLORIDE 15; 6.25 MG/5ML; MG/5ML
5 SYRUP ORAL 4 TIMES DAILY PRN
Qty: 180 ML | Refills: 5 | Status: SHIPPED | OUTPATIENT
Start: 2023-12-14

## 2023-12-14 NOTE — PROGRESS NOTES
Name: Gennaro Goins      : 1960      MRN: [id number]  Encounter Provider: Lala Mckeon MD  Encounter Date: 2023   Encounter department: [address] [place] [address]     1. Acute bronchitis, unspecified organism  Comments:  Supportive measures discussed, follow up if not improving. Orders:  -     azithromycin (ZITHROMAX) 250 mg tablet; Take 2 tablets today then 1 tablet daily x 4 days  -     promethazine-dextromethorphan (PHENERGAN-DM) 6.25-15 mg/5 mL oral syrup; Take 5 mL by mouth 4 (four) times a day as needed for cough      Depression Screening and Follow-up Plan: Patient was screened for depression during today's encounter. They screened negative with a PHQ-2 score of 0. Subjective      Started over 10 days ago with a little congestion, sore throat, cough. Had fatigue and malaise. No fever. About one week ago started to feel worse with a worsening cough. Taking advil cold, robitussin honey, navage without relief. Review of Systems   Constitutional:  Positive for fatigue. Negative for fever. HENT:  Positive for congestion, rhinorrhea and sore throat. Respiratory:  Positive for cough. Current Outpatient Medications on File Prior to Visit   Medication Sig   • Cozaar 50 MG tablet TAKE 1 TABLET DAILY   • EPINEPHrine (EPIPEN) 0.3 mg/0.3 mL SOAJ Inject 0.3 mL (0.3 mg total) into a muscle once for 1 dose   • Lumigan 0.01 % ophthalmic drops Administer 1 drop to both eyes daily at bedtime   • Multiple Vitamins-Minerals (multivitamin with minerals) tablet Take 1 tablet by mouth daily   • omeprazole (PriLOSEC) 40 MG capsule TAKE 1 CAPSULE DAILY   • valACYclovir (VALTREX) 1,000 mg tablet Take 2 at onset of cold sore and repeat in 12 hours. Objective     /76   Pulse 87   Temp 97.6 °F (36.4 °C)   Resp (!) 98   Ht 5' 5" (1.651 m)   Wt 79.8 kg (176 lb)   BMI 29.29 kg/m²     Physical Exam  HENT:      Right Ear: Tympanic membrane is retracted. Left Ear: Tympanic membrane is retracted. Nose: Mucosal edema and rhinorrhea present. Cardiovascular:      Rate and Rhythm: Normal rate and regular rhythm. Heart sounds: Normal heart sounds. Pulmonary:      Effort: Pulmonary effort is normal.      Breath sounds: Normal breath sounds. No wheezing or rales. Musculoskeletal:      Cervical back: Neck supple. Lymphadenopathy:      Cervical: No cervical adenopathy.        Mamie Marte MD

## 2023-12-15 DIAGNOSIS — I10 BENIGN ESSENTIAL HYPERTENSION: ICD-10-CM

## 2023-12-15 RX ORDER — LOSARTAN POTASSIUM 50 MG
50 TABLET ORAL DAILY
Qty: 90 TABLET | Refills: 0 | Status: SHIPPED | OUTPATIENT
Start: 2023-12-15

## 2024-02-20 ENCOUNTER — NURSE TRIAGE (OUTPATIENT)
Age: 64
End: 2024-02-20

## 2024-02-20 ENCOUNTER — OFFICE VISIT (OUTPATIENT)
Dept: FAMILY MEDICINE CLINIC | Facility: CLINIC | Age: 64
End: 2024-02-20
Payer: COMMERCIAL

## 2024-02-20 VITALS
RESPIRATION RATE: 17 BRPM | HEIGHT: 65 IN | SYSTOLIC BLOOD PRESSURE: 130 MMHG | TEMPERATURE: 98.4 F | HEART RATE: 72 BPM | WEIGHT: 177.4 LBS | DIASTOLIC BLOOD PRESSURE: 88 MMHG | BODY MASS INDEX: 29.56 KG/M2

## 2024-02-20 DIAGNOSIS — R10.9 LEFT SIDED ABDOMINAL PAIN: Primary | ICD-10-CM

## 2024-02-20 DIAGNOSIS — B00.9 HERPES SIMPLEX: ICD-10-CM

## 2024-02-20 DIAGNOSIS — I10 BENIGN ESSENTIAL HYPERTENSION: ICD-10-CM

## 2024-02-20 PROCEDURE — 99214 OFFICE O/P EST MOD 30 MIN: CPT | Performed by: FAMILY MEDICINE

## 2024-02-20 RX ORDER — VALACYCLOVIR HYDROCHLORIDE 1 G/1
TABLET, FILM COATED ORAL
Qty: 20 TABLET | Refills: 3 | Status: SHIPPED | OUTPATIENT
Start: 2024-02-20 | End: 2025-08-23

## 2024-02-20 NOTE — PROGRESS NOTES
Name: Rufina Badillo      : 1960      MRN: [id number]  Encounter Provider: Adeola Valverde DO  Encounter Date: 2024   Encounter department: [facility]    Assessment & Plan   1. Left sided abdominal pain    2. Herpes simplex  -     valACYclovir (VALTREX) 1,000 mg tablet; Take 2 at onset of cold sore and repeat in 12 hours.    3. Benign essential hypertension        Assessment & Plan  1. Left-sided abdominal pain.  She has some diverticula on the side of her colon. I think it was gas pain. Her colonoscopy is up to date. She will follow up with gastroenterology. She will take simethicone as needed. If the pain recurs, she will take the simethicone again. If that does not work, she will go to the ER.    2. Cold sores.  I will refill her valacyclovir.    3. Hypertension.  Her blood pressure is excellent today. She will continue Cozaar 50 mg.    Follow-up  The patient will follow up in 2025.         Subjective      History of Present Illness  The patient presents for evaluation of left-sided abdominal pain.    For the last 1 year or more, she has been getting a weird pain on the left side of her abdomen after she eats. This morning at 6:00  she woke up with horrible pain which eventually started radiating towards the back. She has had gas pains before which she thought was gas. Usually, it is stabbing, excruciating pain. This time, she had pain throughout this whole area for about 2.5 hours. She took 3 simethicone and 1.5 hours later, the pain started to subside. She was walking, laying on her stomach, back, sides, and trying to get in to the gas out.  She finally went back to bed and laid down and found a comfortable position. She fell asleep and when she woke up, it had subsided for the most part.     She does not have the pain right now. She denies any diarrhea or blood in her urine. She denies any fever. She was getting cold with cold sweats. She drank some water and felt nauseous right  "after drinking the water, but she never threw up. She was going to have her  take her to the emergency room because it was so severe. It is not a pain she has ever had before.    She needs a refill of her valacyclovir. She has been getting a lot of cold sores lately. She needs to have her thyroid scan done.   Her aunt had colon cancer.  Review of Systems      Objective     /88   Pulse 72   Temp 98.4 °F (36.9 °C)   Resp 17   Ht 5' 5\" (1.651 m)   Wt 80.5 kg (177 lb 6.4 oz)   BMI 29.52 kg/m²     Physical Exam  Vital Signs  Blood pressure is 130/88.  Physical Exam  Vitals and nursing note reviewed.   Constitutional:       Appearance: She is well-developed.   HENT:      Head: Normocephalic and atraumatic.      Right Ear: Tympanic membrane and external ear normal.      Left Ear: Tympanic membrane and external ear normal.   Cardiovascular:      Rate and Rhythm: Normal rate and regular rhythm.      Heart sounds: Normal heart sounds. No murmur heard.     No friction rub.   Pulmonary:      Effort: Pulmonary effort is normal. No respiratory distress.      Breath sounds: Normal breath sounds. No wheezing or rales.   Abdominal:      Tenderness: There is no abdominal tenderness.   Musculoskeletal:      Right lower leg: No edema.      Left lower leg: No edema.           "

## 2024-02-20 NOTE — TELEPHONE ENCOUNTER
"Reason for Disposition   MILD pain (e.g., does not interfere with normal activities) and pain comes and goes (cramps) lasts > 48 hours (Exception: this same abdominal pain is a chronic symptom recurrent or ongoing AND present > 4 weeks)    Answer Assessment - Initial Assessment Questions  1. LOCATION: \"Where does it hurt?\"       Left mid abdominal pain  2. RADIATION: \"Does the pain shoot anywhere else?\" (e.g., chest, back)      denies  3. ONSET: \"When did the pain begin?\" (e.g., minutes, hours or days ago)      6 am this morning , 2 1/2 hours  4. SUDDEN: \"Gradual or sudden onset?\"      sudden  5. PATTERN \"Does the pain come and go, or is it constant?\"     - If constant: \"Is it getting better, staying the same, or worsening?\"       (Note: Constant means the pain never goes away completely; most serious pain is constant and it progresses)      - If intermittent: \"How long does it last?\" \"Do you have pain now?\"      (Note: Intermittent means the pain goes away completely between bouts)      One episode at 6 am  6. SEVERITY: \"How bad is the pain?\"  (e.g., Scale 1-10; mild, moderate, or severe)    - MILD (1-3): doesn't interfere with normal activities, abdomen soft and not tender to touch     - MODERATE (4-7): interferes with normal activities or awakens from sleep, tender to touch     - SEVERE (8-10): excruciating pain, doubled over, unable to do any normal activities       Severe this morning, mild now   7. RECURRENT SYMPTOM: \"Have you ever had this type of stomach pain before?\" If Yes, ask: \"When was the last time?\" and \"What happened that time?\"       Has had sporadic \" twinges \" of the left side x one year, after eating   8. CAUSE: \"What do you think is causing the stomach pain?\"      unknown  9. RELIEVING/AGGRAVATING FACTORS: \"What makes it better or worse?\" (e.g., movement, antacids, bowel movement)      Took simethicone , laying down helped. When eating seems to get a  twinge in that area for about a year " "now  10. OTHER SYMPTOMS: \"Has there been any vomiting, diarrhea, constipation, or urine problems?\"        Cold sweat, nausea at the time of episode   11. PREGNANCY: \"Is there any chance you are pregnant?\" \"When was your last menstrual period?\"        N/a    Protocols used: Abdominal Pain - Female-ADULT-[place]    "

## 2024-02-25 DIAGNOSIS — I10 BENIGN ESSENTIAL HYPERTENSION: ICD-10-CM

## 2024-02-25 RX ORDER — LOSARTAN POTASSIUM 50 MG
50 TABLET ORAL DAILY
Qty: 90 TABLET | Refills: 1 | Status: SHIPPED | OUTPATIENT
Start: 2024-02-25

## 2024-04-16 ENCOUNTER — HOSPITAL ENCOUNTER (OUTPATIENT)
Dept: RADIOLOGY | Facility: HOSPITAL | Age: 64
Discharge: HOME | End: 2024-04-16
Payer: COMMERCIAL

## 2024-04-16 DIAGNOSIS — E04.2 MULTIPLE THYROID NODULES: ICD-10-CM

## 2024-04-16 PROCEDURE — 76536 US EXAM OF HEAD AND NECK: CPT

## 2024-04-23 ENCOUNTER — OFFICE VISIT (OUTPATIENT)
Dept: FAMILY MEDICINE CLINIC | Facility: CLINIC | Age: 64
End: 2024-04-23
Payer: COMMERCIAL

## 2024-04-23 VITALS
TEMPERATURE: 98.3 F | RESPIRATION RATE: 18 BRPM | HEART RATE: 74 BPM | BODY MASS INDEX: 29.29 KG/M2 | DIASTOLIC BLOOD PRESSURE: 80 MMHG | HEIGHT: 65 IN | SYSTOLIC BLOOD PRESSURE: 126 MMHG | WEIGHT: 175.8 LBS

## 2024-04-23 DIAGNOSIS — R73.03 PREDIABETES: ICD-10-CM

## 2024-04-23 DIAGNOSIS — Z12.31 SCREENING MAMMOGRAM, ENCOUNTER FOR: ICD-10-CM

## 2024-04-23 DIAGNOSIS — E78.5 DYSLIPIDEMIA: ICD-10-CM

## 2024-04-23 DIAGNOSIS — Z13.6 SCREENING FOR CARDIOVASCULAR CONDITION: ICD-10-CM

## 2024-04-23 DIAGNOSIS — E06.3 HASHIMOTO'S THYROIDITIS: ICD-10-CM

## 2024-04-23 DIAGNOSIS — I10 BENIGN ESSENTIAL HYPERTENSION: Primary | ICD-10-CM

## 2024-04-23 PROBLEM — R92.30 DENSE BREAST: Status: RESOLVED | Noted: 2019-04-19 | Resolved: 2024-04-23

## 2024-04-23 PROCEDURE — 99214 OFFICE O/P EST MOD 30 MIN: CPT | Performed by: FAMILY MEDICINE

## 2024-04-23 NOTE — PROGRESS NOTES
Name: Rufina Badillo      : 1960      MRN: [id number]  Encounter Provider: Adeola Valverde DO  Encounter Date: 2024   Encounter department: [facility]    Assessment & Plan     1. Benign essential hypertension  Assessment & Plan:  Blood pressure is well-controlled  Continue Cozaar 50 mg daily    Orders:  -     CBC; Future; Expected date: 10/05/2024  -     Comprehensive metabolic panel; Future; Expected date: 10/05/2024  -     Lipid Panel with Direct LDL reflex; Future; Expected date: 10/05/2024  -     CBC  -     Comprehensive metabolic panel  -     Lipid Panel with Direct LDL reflex    2. Screening mammogram, encounter for  -     Mammo screening bilateral w 3d & cad; Future; Expected date: 2024    3. Hashimoto's thyroiditis  Assessment & Plan:  Stable  Will continue to monitor thyroid function    Orders:  -     T4, free; Future; Expected date: 10/05/2024  -     TSH, 3rd generation; Future; Expected date: 10/05/2024  -     T4, free  -     TSH, 3rd generation    4. Prediabetes  Assessment & Plan:  Reviewed labs done at work  Fasting sugar was 103    Orders:  -     Hemoglobin A1C; Future; Expected date: 10/05/2024  -     Hemoglobin A1C    5. Dyslipidemia  -     Comprehensive metabolic panel; Future; Expected date: 10/05/2024  -     Lipid Panel with Direct LDL reflex; Future; Expected date: 10/05/2024  -     Comprehensive metabolic panel  -     Lipid Panel with Direct LDL reflex    6. Screening for cardiovascular condition  -     CT coronary calcium score; Future; Expected date: 2024    Return in about 6 months (around 10/23/2024) for Annual physical.       Subjective      Patient ports that she been doing well she did just have blood work done at work and cholesterol has gone up.    She has been restarting a new job and she is hoping the improved environment will be helpful for her      Review of Systems    Current Outpatient Medications on File Prior to Visit   Medication Sig   •  "Cozaar 50 MG tablet TAKE 1 TABLET DAILY   • EPINEPHrine (EPIPEN) 0.3 mg/0.3 mL SOAJ Inject 0.3 mL (0.3 mg total) into a muscle once for 1 dose   • Lumigan 0.01 % ophthalmic drops Administer 1 drop to both eyes daily at bedtime   • Multiple Vitamins-Minerals (multivitamin with minerals) tablet Take 1 tablet by mouth daily   • omeprazole (PriLOSEC) 40 MG capsule TAKE 1 CAPSULE DAILY   • promethazine-dextromethorphan (PHENERGAN-DM) 6.25-15 mg/5 mL oral syrup Take 5 mL by mouth 4 (four) times a day as needed for cough   • valACYclovir (VALTREX) 1,000 mg tablet Take 2 at onset of cold sore and repeat in 12 hours.       Objective     /80   Pulse 74   Temp 98.3 °F (36.8 °C)   Resp 18   Ht 5' 5\" (1.651 m)   Wt 79.7 kg (175 lb 12.8 oz)   BMI 29.25 kg/m²     Physical Exam  Vitals and nursing note reviewed.   Constitutional:       Appearance: She is well-developed.   HENT:      Head: Normocephalic and atraumatic.      Right Ear: Tympanic membrane and external ear normal.      Left Ear: Tympanic membrane and external ear normal.   Cardiovascular:      Rate and Rhythm: Normal rate and regular rhythm.      Heart sounds: Normal heart sounds. No murmur heard.     No friction rub.   Pulmonary:      Effort: Pulmonary effort is normal. No respiratory distress.      Breath sounds: Normal breath sounds. No wheezing or rales.   Musculoskeletal:      Right lower leg: No edema.      Left lower leg: No edema.       Adeola Valverde, DO    "

## 2024-05-11 ENCOUNTER — HOSPITAL ENCOUNTER (OUTPATIENT)
Dept: CT IMAGING | Facility: HOSPITAL | Age: 64
Discharge: HOME | End: 2024-05-11
Payer: COMMERCIAL

## 2024-05-11 DIAGNOSIS — Z13.6 SCREENING FOR CARDIOVASCULAR CONDITION: ICD-10-CM

## 2024-05-11 PROCEDURE — 75571 CT HRT W/O DYE W/CA TEST: CPT

## 2024-05-20 ENCOUNTER — TELEPHONE (OUTPATIENT)
Dept: FAMILY MEDICINE CLINIC | Facility: CLINIC | Age: 64
End: 2024-05-20

## 2024-05-20 DIAGNOSIS — E78.5 DYSLIPIDEMIA: Primary | ICD-10-CM

## 2024-05-20 RX ORDER — ATORVASTATIN CALCIUM 20 MG/1
20 TABLET, FILM COATED ORAL DAILY
Qty: 90 TABLET | Refills: 3 | Status: SHIPPED | OUTPATIENT
Start: 2024-05-20 | End: 2024-05-20 | Stop reason: SDUPTHER

## 2024-05-20 RX ORDER — ATORVASTATIN CALCIUM 20 MG/1
20 TABLET, FILM COATED ORAL DAILY
Qty: 30 TABLET | Refills: 3 | Status: SHIPPED | OUTPATIENT
Start: 2024-05-20

## 2024-05-20 NOTE — TELEPHONE ENCOUNTER
5/20/2024 8:32 AM Called Rufina regarding her Coronary Calcium Score results    She does have some plaque     We discussed risk of heart disease and that heart disease is the number 1 killer of women.    She agreed to start a low dose statin.  Risks and benefits of medication discussed.       Adeola Valverde,

## 2024-06-11 DIAGNOSIS — E78.5 DYSLIPIDEMIA: ICD-10-CM

## 2024-06-11 DIAGNOSIS — K21.9 GERD WITHOUT ESOPHAGITIS: ICD-10-CM

## 2024-06-11 DIAGNOSIS — I10 BENIGN ESSENTIAL HYPERTENSION: ICD-10-CM

## 2024-06-11 NOTE — TELEPHONE ENCOUNTER
I started a new job and need to have my 90 day RX prescriptions changed over to my new [employer] plan.  I think the only ones I have through mail order are Omeprizole, Cozaar, Lumigan Opth and Atorvastatin (haven't had an issue with the cholesterol med so I think we're good to fill for 90 days).   New card info is attached.  Please don’t hesitate to reach out if you have any questions.  Thanks and have a great day,  Rufina

## 2024-06-12 RX ORDER — ATORVASTATIN CALCIUM 20 MG/1
20 TABLET, FILM COATED ORAL DAILY
Qty: 90 TABLET | Refills: 1 | Status: SHIPPED | OUTPATIENT
Start: 2024-06-12

## 2024-06-12 RX ORDER — BIMATOPROST 0.1 MG/ML
1 SOLUTION/ DROPS OPHTHALMIC
OUTPATIENT
Start: 2024-06-12

## 2024-06-12 RX ORDER — LOSARTAN POTASSIUM 50 MG
50 TABLET ORAL DAILY
Qty: 90 TABLET | Refills: 1 | Status: SHIPPED | OUTPATIENT
Start: 2024-06-12

## 2024-06-12 RX ORDER — OMEPRAZOLE 40 MG/1
40 CAPSULE, DELAYED RELEASE ORAL DAILY
Qty: 90 CAPSULE | Refills: 1 | Status: SHIPPED | OUTPATIENT
Start: 2024-06-12

## 2024-06-12 NOTE — TELEPHONE ENCOUNTER
I refilled her prescriptions except for the Lumigan.  That she needs to get from her ophthalmologist  Thank you,  Adeola Valverde, DO

## 2024-08-14 ENCOUNTER — OFFICE VISIT (OUTPATIENT)
Dept: FAMILY MEDICINE CLINIC | Facility: CLINIC | Age: 64
End: 2024-08-14
Payer: COMMERCIAL

## 2024-08-14 VITALS
SYSTOLIC BLOOD PRESSURE: 120 MMHG | WEIGHT: 174 LBS | BODY MASS INDEX: 28.99 KG/M2 | DIASTOLIC BLOOD PRESSURE: 80 MMHG | OXYGEN SATURATION: 98 % | RESPIRATION RATE: 18 BRPM | TEMPERATURE: 97.8 F | HEIGHT: 65 IN | HEART RATE: 68 BPM

## 2024-08-14 DIAGNOSIS — M79.661 RIGHT CALF PAIN: Primary | ICD-10-CM

## 2024-08-14 PROCEDURE — 99213 OFFICE O/P EST LOW 20 MIN: CPT | Performed by: NURSE PRACTITIONER

## 2024-08-14 NOTE — PROGRESS NOTES
Ambulatory Visit  Name: Rufina Badillo      : 1960      MRN: [id number]  Encounter Provider: KANDI Wong  Encounter Date: 2024   Encounter department: [facility]    Low suspicion for DVT, however will check Doppler.  Discussed likelihood of Baker's cyst, as she has had this in the past .  Will follow-up with results  Assessment & Plan   1. Right calf pain  -     VAS VENOUS DUPLEX -LOWER LIMB UNILATERAL; Future; Expected date: 2024       History of Present Illness     Here today with complaints of right calf/knee pain for the past 3 weeks.  This started when she was walking on her treadmill.  It does not hurt with bending or movement, only feels it with walking.  She is concerned about DVT, as her mother had them frequently.  She traveled to [place] back in .  No chest pains        Review of Systems   Constitutional: Negative.    Respiratory: Negative.     Cardiovascular: Negative.    Gastrointestinal: Negative.    Musculoskeletal:         See HPI     Current Outpatient Medications on File Prior to Visit   Medication Sig Dispense Refill   • atorvastatin (LIPITOR) 20 mg tablet Take 1 tablet (20 mg total) by mouth daily 90 tablet 1   • Cozaar 50 MG tablet Take 1 tablet (50 mg total) by mouth daily 90 tablet 1   • EPINEPHrine (EPIPEN) 0.3 mg/0.3 mL SOAJ Inject 0.3 mL (0.3 mg total) into a muscle once for 1 dose 0.6 mL 0   • Lumigan 0.01 % ophthalmic drops Administer 1 drop to both eyes daily at bedtime     • Multiple Vitamins-Minerals (multivitamin with minerals) tablet Take 1 tablet by mouth daily     • omeprazole (PriLOSEC) 40 MG capsule Take 1 capsule (40 mg total) by mouth daily 90 capsule 1   • promethazine-dextromethorphan (PHENERGAN-DM) 6.25-15 mg/5 mL oral syrup Take 5 mL by mouth 4 (four) times a day as needed for cough 180 mL 5   • valACYclovir (VALTREX) 1,000 mg tablet Take 2 at onset of cold sore and repeat in 12 hours. 20 tablet 3     No current  "facility-administered medications on file prior to visit.      Social History     Tobacco Use   • Smoking status: Former     Current packs/day: 0.00     Average packs/day: 0.3 packs/day for 7.0 years (1.8 ttl pk-yrs)     Types: Cigarettes     Start date: 6/15/1972     Quit date: 6/15/1979     Years since quittin.2     Passive exposure: Never   • Smokeless tobacco: Never   Vaping Use   • Vaping status: Never Used   Substance and Sexual Activity   • Alcohol use: Yes     Comment: rare   • Drug use: Never   • Sexual activity: Yes     Objective     /80   Pulse 68   Temp 97.8 °F (36.6 °C)   Resp 18   Ht 5' 5\" (1.651 m)   Wt 78.9 kg (174 lb)   SpO2 98%   BMI 28.96 kg/m²     Physical Exam  Vitals and nursing note reviewed.   Constitutional:       General: She is not in acute distress.     Appearance: Normal appearance. She is well-developed. She is not diaphoretic.   Eyes:      Conjunctiva/sclera: Conjunctivae normal.   Pulmonary:      Effort: Pulmonary effort is normal. No respiratory distress.   Musculoskeletal:      Right knee: Normal.      Left knee: Normal.      Comments: Negative Lauren's sign bilaterally   Neurological:      Mental Status: She is alert.   Psychiatric:         Mood and Affect: Mood normal.         Behavior: Behavior normal.       Administrative Statements           "

## 2024-08-20 ENCOUNTER — HOSPITAL ENCOUNTER (OUTPATIENT)
Dept: NON INVASIVE DIAGNOSTICS | Facility: CLINIC | Age: 64
Discharge: HOME | End: 2024-08-20
Payer: COMMERCIAL

## 2024-08-20 DIAGNOSIS — M79.661 RIGHT CALF PAIN: ICD-10-CM

## 2024-08-20 PROCEDURE — 93971 EXTREMITY STUDY: CPT

## 2024-08-20 PROCEDURE — 93971 EXTREMITY STUDY: CPT | Performed by: SURGERY

## 2024-08-27 DIAGNOSIS — E78.5 DYSLIPIDEMIA: Primary | ICD-10-CM

## 2024-08-27 DIAGNOSIS — R73.03 PREDIABETES: ICD-10-CM

## 2024-08-27 DIAGNOSIS — R23.3 ABNORMAL BRUISING: ICD-10-CM

## 2024-08-27 DIAGNOSIS — E04.2 MULTIPLE THYROID NODULES: ICD-10-CM

## 2024-08-30 LAB
ALBUMIN SERPL-MCNC: 4.4 G/DL (ref 3.9–4.9)
ALP SERPL-CCNC: 112 IU/L (ref 44–121)
ALT SERPL-CCNC: 18 IU/L (ref 0–32)
AST SERPL-CCNC: 22 IU/L (ref 0–40)
BASOPHILS # BLD AUTO: 0.1 X10E3/UL (ref 0–0.2)
BASOPHILS NFR BLD AUTO: 1 %
BILIRUB SERPL-MCNC: 0.3 MG/DL (ref 0–1.2)
BUN SERPL-MCNC: 16 MG/DL (ref 8–27)
BUN/CREAT SERPL: 18 (ref 12–28)
CALCIUM SERPL-MCNC: 9.7 MG/DL (ref 8.7–10.3)
CHLORIDE SERPL-SCNC: 105 MMOL/L (ref 96–106)
CHOLEST SERPL-MCNC: 149 MG/DL (ref 100–199)
CO2 SERPL-SCNC: 25 MMOL/L (ref 20–29)
CREAT SERPL-MCNC: 0.89 MG/DL (ref 0.57–1)
EGFR: 73 ML/MIN/1.73
EOSINOPHIL # BLD AUTO: 0.1 X10E3/UL (ref 0–0.4)
EOSINOPHIL NFR BLD AUTO: 2 %
ERYTHROCYTE [DISTWIDTH] IN BLOOD BY AUTOMATED COUNT: 12.6 % (ref 11.7–15.4)
EST. AVERAGE GLUCOSE BLD GHB EST-MCNC: 134 MG/DL
GLOBULIN SER-MCNC: 2.6 G/DL (ref 1.5–4.5)
GLUCOSE SERPL-MCNC: 96 MG/DL (ref 70–99)
HBA1C MFR BLD: 6.3 % (ref 4.8–5.6)
HCT VFR BLD AUTO: 36.9 % (ref 34–46.6)
HDLC SERPL-MCNC: 55 MG/DL
HGB BLD-MCNC: 12.2 G/DL (ref 11.1–15.9)
IMM GRANULOCYTES # BLD: 0 X10E3/UL (ref 0–0.1)
IMM GRANULOCYTES NFR BLD: 0 %
LDLC SERPL CALC-MCNC: 72 MG/DL (ref 0–99)
LDLC/HDLC SERPL: 1.3 RATIO (ref 0–3.2)
LYMPHOCYTES # BLD AUTO: 2.6 X10E3/UL (ref 0.7–3.1)
LYMPHOCYTES NFR BLD AUTO: 41 %
MCH RBC QN AUTO: 29.8 PG (ref 26.6–33)
MCHC RBC AUTO-ENTMCNC: 33.1 G/DL (ref 31.5–35.7)
MCV RBC AUTO: 90 FL (ref 79–97)
MICRODELETION SYND BLD/T FISH: NORMAL
MONOCYTES # BLD AUTO: 0.5 X10E3/UL (ref 0.1–0.9)
MONOCYTES NFR BLD AUTO: 9 %
NEUTROPHILS # BLD AUTO: 2.9 X10E3/UL (ref 1.4–7)
NEUTROPHILS NFR BLD AUTO: 47 %
PLATELET # BLD AUTO: 263 X10E3/UL (ref 150–450)
POTASSIUM SERPL-SCNC: 4.6 MMOL/L (ref 3.5–5.2)
PROT SERPL-MCNC: 7 G/DL (ref 6–8.5)
RBC # BLD AUTO: 4.09 X10E6/UL (ref 3.77–5.28)
SL AMB VLDL CHOLESTEROL CALC: 22 MG/DL (ref 5–40)
SODIUM SERPL-SCNC: 142 MMOL/L (ref 134–144)
T4 FREE SERPL-MCNC: 1.06 NG/DL (ref 0.82–1.77)
TRIGL SERPL-MCNC: 128 MG/DL (ref 0–149)
TSH SERPL DL<=0.005 MIU/L-ACNC: 3.64 UIU/ML (ref 0.45–4.5)
WBC # BLD AUTO: 6.2 X10E3/UL (ref 3.4–10.8)

## 2024-10-28 ENCOUNTER — OFFICE VISIT (OUTPATIENT)
Dept: FAMILY MEDICINE CLINIC | Facility: CLINIC | Age: 64
End: 2024-10-28
Payer: COMMERCIAL

## 2024-10-28 VITALS
DIASTOLIC BLOOD PRESSURE: 74 MMHG | HEART RATE: 72 BPM | WEIGHT: 168 LBS | SYSTOLIC BLOOD PRESSURE: 110 MMHG | RESPIRATION RATE: 16 BRPM | BODY MASS INDEX: 27.99 KG/M2 | TEMPERATURE: 99.3 F | HEIGHT: 65 IN

## 2024-10-28 DIAGNOSIS — R73.03 PREDIABETES: ICD-10-CM

## 2024-10-28 DIAGNOSIS — E04.2 MULTIPLE THYROID NODULES: ICD-10-CM

## 2024-10-28 DIAGNOSIS — Z00.00 ANNUAL PHYSICAL EXAM: Primary | ICD-10-CM

## 2024-10-28 DIAGNOSIS — I10 BENIGN ESSENTIAL HYPERTENSION: ICD-10-CM

## 2024-10-28 PROCEDURE — 99396 PREV VISIT EST AGE 40-64: CPT | Performed by: FAMILY MEDICINE

## 2024-10-28 NOTE — PATIENT INSTRUCTIONS
"Patient Education     Routine physical for adults   The Basics   Written by the doctors and editors at [facility]   What is a physical? -- A physical is a routine visit, or \"check-up,\" with your doctor. You might also hear it called a \"wellness visit\" or \"preventive visit.\"  During each visit, the doctor will:   Ask about your physical and mental health   Ask about your habits, behaviors, and lifestyle   Do an exam   Give you vaccines if needed   Talk to you about any medicines you take   Give advice about your health   Answer your questions  Getting regular check-ups is an important part of taking care of your health. It can help your doctor find and treat any problems you have. But it's also important for preventing health problems.  A routine physical is different from a \"sick visit.\" A sick visit is when you see a doctor because of a health concern or problem. Since physicals are scheduled ahead of time, you can think about what you want to ask the doctor.  How often should I get a physical? -- It depends on your age and health. In general, for people age 21 years and older:   If you are younger than 50 years, you might be able to get a physical every 3 years.   If you are 50 years or older, your doctor might recommend a physical every year.  If you have an ongoing health condition, like diabetes or high blood pressure, your doctor will probably want to see you more often.  What happens during a physical? -- In general, each visit will include:   Physical exam - The doctor or nurse will check your height, weight, heart rate, and blood pressure. They will also look at your eyes and ears. They will ask about how you are feeling and whether you have any symptoms that bother you.   Medicines - It's a good idea to bring a list of all the medicines you take to each doctor visit. Your doctor will talk to you about your medicines and answer any questions. Tell them if you are having any side effects that bother you. You " "should also tell them if you are having trouble paying for any of your medicines.   Habits and behaviors - This includes:   Your diet   Your exercise habits   Whether you smoke, drink alcohol, or use drugs   Whether you are sexually active   Whether you feel safe at home  Your doctor will talk to you about things you can do to improve your health and lower your risk of health problems. They will also offer help and support. For example, if you want to quit smoking, they can give you advice and might prescribe medicines. If you want to improve your diet or get more physical activity, they can help you with this, too.   Lab tests, if needed - The tests you get will depend on your age and situation. For example, your doctor might want to check your:   Cholesterol   Blood sugar   Iron level   Vaccines - The recommended vaccines will depend on your age, health, and what vaccines you already had. Vaccines are very important because they can prevent certain serious or deadly infections.   Discussion of screening - \"Screening\" means checking for diseases or other health problems before they cause symptoms. Your doctor can recommend screening based on your age, risk, and preferences. This might include tests to check for:   Cancer, such as breast, prostate, cervical, ovarian, colorectal, prostate, lung, or skin cancer   Sexually transmitted infections, such as chlamydia and gonorrhea   Mental health conditions like depression and anxiety  Your doctor will talk to you about the different types of screening tests. They can help you decide which screenings to have. They can also explain what the results might mean.   Answering questions - The physical is a good time to ask the doctor or nurse questions about your health. If needed, they can refer you to other doctors or specialists, too.  Adults older than 65 years often need other care, too. As you get older, your doctor will talk to you about:   How to prevent falling at " home   Hearing or vision tests   Memory testing   How to take your medicines safely   Making sure that you have the help and support you need at home  All topics are updated as new evidence becomes available and our peer review process is complete.  This topic retrieved from [employer] on: May 02, 2024.  Topic 806961 Version 1.0  Release: 32.4.3 - C32.122  © 2024 UpToDate, Inc. and/or its affiliates. All rights reserved.  Consumer Information Use and Disclaimer   Disclaimer: This generalized information is a limited summary of diagnosis, treatment, and/or medication information. It is not meant to be comprehensive and should be used as a tool to help the user understand and/or assess potential diagnostic and treatment options. It does NOT include all information about conditions, treatments, medications, side effects, or risks that may apply to a specific patient. It is not intended to be medical advice or a substitute for the medical advice, diagnosis, or treatment of a health care provider based on the health care provider's examination and assessment of a patient's specific and unique circumstances. Patients must speak with a health care provider for complete information about their health, medical questions, and treatment options, including any risks or benefits regarding use of medications. This information does not endorse any treatments or medications as safe, effective, or approved for treating a specific patient. UpToDate, Inc. and its affiliates disclaim any warranty or liability relating to this information or the use thereof.The use of this information is governed by the Terms of Use, available at https://www.woltersToygaroo.comuwer.com/en/know/clinical-effectiveness-terms. 2024© UpToDate, Inc. and its affiliates and/or licensors. All rights reserved.  Copyright   © 2024 UpToDate, Inc. and/or its affiliates. All rights reserved.

## 2024-10-28 NOTE — PROGRESS NOTES
"Adult Annual Physical  Name: Rufina Badillo      : 1960      MRN: [id number]  Encounter Provider: Adeola Valverde DO  Encounter Date: 10/28/2024   Encounter department: [facility]    Assessment & Plan  Annual physical exam           Immunizations and preventive care screenings were discussed with patient today. Appropriate education was printed on patient's after visit summary.    Counseling:  Dental Health: discussed importance of regular tooth brushing, flossing, and dental visits.  Exercise: the importance of regular exercise/physical activity was discussed. Recommend exercise 3-5 times per week for at least 30 minutes.      Return in about 6 months (around 2025) for Next scheduled follow up.    History of Present Illness     Adult Annual Physical:  Patient presents for annual physical. She has been watching her weight. .     Diet and Physical Activity:  - Diet/Nutrition: well balanced diet.  - Exercise: no formal exercise.    Depression Screening:  - PHQ-2 Score: 0    General Health:  - Sleep: sleeps poorly.  - Hearing: tinnitus and decreased hearing right ear.  - Vision: goes for regular eye exams.    /GYN Health:  - Follows with GYN: no.   - Menopause: postmenopausal.     Review of Systems   Constitutional: Negative.    Respiratory: Negative.     Cardiovascular: Negative.          Objective     /74   Pulse 72   Temp 99.3 °F (37.4 °C)   Resp 16   Ht 5' 5\" (1.651 m)   Wt 76.2 kg (168 lb)   BMI 27.96 kg/m²     Physical Exam  Vitals and nursing note reviewed.   Constitutional:       Appearance: She is well-developed.   HENT:      Head: Normocephalic and atraumatic.      Right Ear: External ear normal.      Left Ear: External ear normal.      Nose: Nose normal.   Cardiovascular:      Rate and Rhythm: Normal rate and regular rhythm.      Heart sounds: Normal heart sounds. No murmur heard.     No friction rub.   Pulmonary:      Effort: No respiratory distress.      Breath " sounds: Normal breath sounds. No wheezing or rales.   Abdominal:      Palpations: Abdomen is soft.      Tenderness: There is no abdominal tenderness.   Musculoskeletal:      Right lower leg: No edema.      Left lower leg: No edema.   Neurological:      Mental Status: She is oriented to person, place, and time.      Cranial Nerves: No cranial nerve deficit.      Vision Screening - Comments:: Going to eye doctor for glaucoma

## 2024-11-18 DIAGNOSIS — K21.9 GERD WITHOUT ESOPHAGITIS: ICD-10-CM

## 2024-11-18 DIAGNOSIS — E78.5 DYSLIPIDEMIA: ICD-10-CM

## 2024-11-18 DIAGNOSIS — I10 BENIGN ESSENTIAL HYPERTENSION: ICD-10-CM

## 2024-11-19 RX ORDER — LOSARTAN POTASSIUM 50 MG
50 TABLET ORAL DAILY
Qty: 90 TABLET | Refills: 1 | Status: SHIPPED | OUTPATIENT
Start: 2024-11-19

## 2024-11-19 RX ORDER — ATORVASTATIN CALCIUM 20 MG/1
20 TABLET, FILM COATED ORAL DAILY
Qty: 90 TABLET | Refills: 1 | Status: SHIPPED | OUTPATIENT
Start: 2024-11-19

## 2024-11-19 RX ORDER — OMEPRAZOLE 40 MG/1
40 CAPSULE, DELAYED RELEASE ORAL DAILY
Qty: 90 CAPSULE | Refills: 1 | Status: SHIPPED | OUTPATIENT
Start: 2024-11-19

## 2025-01-23 ENCOUNTER — OFFICE VISIT (OUTPATIENT)
Dept: PODIATRY | Facility: CLINIC | Age: 65
End: 2025-01-23
Payer: COMMERCIAL

## 2025-01-23 VITALS — BODY MASS INDEX: 27.66 KG/M2 | HEIGHT: 65 IN | WEIGHT: 166 LBS | RESPIRATION RATE: 18 BRPM

## 2025-01-23 DIAGNOSIS — M20.12 ACQUIRED HALLUX VALGUS OF LEFT FOOT: ICD-10-CM

## 2025-01-23 DIAGNOSIS — M20.11 ACQUIRED HALLUX VALGUS OF RIGHT FOOT: Primary | ICD-10-CM

## 2025-01-23 DIAGNOSIS — M19.071 ARTHRITIS OF RIGHT FOOT: ICD-10-CM

## 2025-01-23 PROCEDURE — 99204 OFFICE O/P NEW MOD 45 MIN: CPT | Performed by: PODIATRIST

## 2025-01-23 PROCEDURE — 20600 DRAIN/INJ JOINT/BURSA W/O US: CPT | Performed by: PODIATRIST

## 2025-01-23 RX ORDER — LIDOCAINE HYDROCHLORIDE 10 MG/ML
1 INJECTION, SOLUTION INFILTRATION; PERINEURAL
Status: SHIPPED | OUTPATIENT
Start: 2025-01-23

## 2025-01-23 RX ORDER — TRIAMCINOLONE ACETONIDE 40 MG/ML
20 INJECTION, SUSPENSION INTRA-ARTICULAR; INTRAMUSCULAR
Status: SHIPPED | OUTPATIENT
Start: 2025-01-23

## 2025-01-23 RX ADMIN — TRIAMCINOLONE ACETONIDE 20 MG: 40 INJECTION, SUSPENSION INTRA-ARTICULAR; INTRAMUSCULAR at 16:30

## 2025-01-23 RX ADMIN — LIDOCAINE HYDROCHLORIDE 1 ML: 10 INJECTION, SOLUTION INFILTRATION; PERINEURAL at 16:30

## 2025-01-23 NOTE — PROGRESS NOTES
Name: Rufina Badillo      : 1960      MRN: [id number]  Encounter Provider: Jakob Loza DPM  Encounter Date: 2025   [facility] x-rays of the right foot.  They reveal a hallux valgus deformity with mild metatarsal adductus.  Intermetatarsal angle of 13 degrees.  There is degenerative changes of the right instep.    I personally viewed x-rays of the left foot.  They reveal a hallux valgus deformity.  Intermetatarsal angle of 14 degrees.    Explained to patient that she is dealing with a hallux valgus deformity of each foot.  Recommended addressing the more painful right foot.  Discussed conservative and surgical treatment plans.  Patient is desirous of surgical intervention and an Christian bunionectomy was recommended.  Procedure was explained including pre and postoperative course.  Patient will be seen 1 to 2 weeks prior to her surgery which she desires in 2025.    Patient dealing with an exostosis on the dorsum of the right foot due to osteoarthritis.  The dorsalis pedis artery runs right over this exostosis.  Recommended and cortisone injection over surgical excision.  Injected 0.5 cc Kenalog 40 along with 1 cc 1% Xylocaine to the right foot.  Assessment & Plan  Acquired hallux valgus of right foot    Orders:    XR foot 3+ vw right; Future    Acquired hallux valgus of left foot    Orders:    XR foot 3+ vw left; Future        History of Present Illness   HPI  Rufina Badillo is a 64 y.o. female who presents with a painful bunion deformity of the right foot.  Patient has bunions on both feet but only the right one is painful today.  Bunions have gradually developed over the years.  There is a family history of deformity noted.  Patient tries to wear shoes that do not irritate the bump.    The second concern involves a painful bone prominence on the top of the right foot.  Certain shoes seem to irritate this  "prominence.          Review of Systems   Gastrointestinal:         GERD          Objective   Resp 18   Ht 5' 5\" (1.651 m)   Wt 75.3 kg (166 lb)   BMI 27.62 kg/m²      Physical Exam  Constitutional:       Appearance: Normal appearance.   Cardiovascular:      Pulses: Normal pulses.   Musculoskeletal:         General: Deformity present.      Comments: Hallux valgus deformity bilateral.  Inflamed hyperostosis medial aspect first metatarsal head right foot.  Good range of motion first MPJ bilateral.   Skin:     General: Skin is warm.   Neurological:      General: No focal deficit present.      Mental Status: She is oriented to person, place, and time.       Small joint arthrocentesis: R intertarsal  Universal Protocol:  procedure performed by consultantConsent: Verbal consent obtained.  Risks and benefits: risks, benefits and alternatives were discussed  Consent given by: patient  Patient understanding: patient states understanding of the procedure being performed  Patient identity confirmed: verbally with patient  Supporting Documentation  Indications: pain   Procedure Details  Location: foot - R intertarsal  Needle size: 25 G  Approach: dorsal  Medications administered: 1 mL lidocaine 1 %; 20 mg triamcinolone acetonide 40 mg/mL                  "

## 2025-01-28 ENCOUNTER — TELEPHONE (OUTPATIENT)
Age: 65
End: 2025-01-28

## 2025-01-28 ENCOUNTER — TELEPHONE (OUTPATIENT)
Dept: PODIATRY | Facility: CLINIC | Age: 65
End: 2025-01-28

## 2025-01-28 NOTE — TELEPHONE ENCOUNTER
Called pt back her voicemail stated she does not check her voice mail if the pt calls back please transfer or give her direct line

## 2025-01-28 NOTE — TELEPHONE ENCOUNTER
Caller: Rufina Badillo     Doctor: Denia    Reason for call: Rufina needs to make an appointment for surgery.  Can someone please reach out to her?  Thank you.     Call back#: [phone]

## 2025-02-03 ENCOUNTER — TELEPHONE (OUTPATIENT)
Dept: PODIATRY | Facility: CLINIC | Age: 65
End: 2025-02-03

## 2025-03-07 ENCOUNTER — TELEPHONE (OUTPATIENT)
Age: 65
End: 2025-03-07

## 2025-03-07 NOTE — TELEPHONE ENCOUNTER
Caller: Rufina Badillo    Doctor and/or Office: Dr. Loza/Obie    #: [phone]    Escalation: Surgery/Wants to CX SX for 4/25 and RS for July-18th an on is good with her. She is going on vacations and does not want to worry about her foot not being healed. Please call back and RS/advise. Thanks

## 2025-04-10 ENCOUNTER — TELEPHONE (OUTPATIENT)
Age: 65
End: 2025-04-10

## 2025-04-10 NOTE — TELEPHONE ENCOUNTER
PT called to check if there were any labs needed to be done before her appt on 4/28. I informed her that there were labs in her chart that was placed.

## 2025-04-12 LAB
ALBUMIN SERPL-MCNC: 4.4 G/DL (ref 3.9–4.9)
ALP SERPL-CCNC: 99 IU/L (ref 44–121)
ALT SERPL-CCNC: 16 IU/L (ref 0–32)
AST SERPL-CCNC: 21 IU/L (ref 0–40)
BILIRUB SERPL-MCNC: 0.3 MG/DL (ref 0–1.2)
BUN SERPL-MCNC: 15 MG/DL (ref 8–27)
BUN/CREAT SERPL: 19 (ref 12–28)
CALCIUM SERPL-MCNC: 9.7 MG/DL (ref 8.7–10.3)
CHLORIDE SERPL-SCNC: 103 MMOL/L (ref 96–106)
CHOLEST SERPL-MCNC: 169 MG/DL (ref 100–199)
CO2 SERPL-SCNC: 21 MMOL/L (ref 20–29)
CREAT SERPL-MCNC: 0.8 MG/DL (ref 0.57–1)
EGFR: 82 ML/MIN/1.73
ERYTHROCYTE [DISTWIDTH] IN BLOOD BY AUTOMATED COUNT: 13.9 % (ref 11.7–15.4)
EST. AVERAGE GLUCOSE BLD GHB EST-MCNC: 131 MG/DL
GLOBULIN SER-MCNC: 2.7 G/DL (ref 1.5–4.5)
GLUCOSE SERPL-MCNC: 94 MG/DL (ref 70–99)
HBA1C MFR BLD: 6.2 % (ref 4.8–5.6)
HCT VFR BLD AUTO: 34 % (ref 34–46.6)
HDLC SERPL-MCNC: 64 MG/DL
HGB BLD-MCNC: 11.3 G/DL (ref 11.1–15.9)
LDLC SERPL CALC-MCNC: 80 MG/DL (ref 0–99)
LDLC/HDLC SERPL: 1.3 RATIO (ref 0–3.2)
MCH RBC QN AUTO: 29.1 PG (ref 26.6–33)
MCHC RBC AUTO-ENTMCNC: 33.2 G/DL (ref 31.5–35.7)
MCV RBC AUTO: 88 FL (ref 79–97)
MICRODELETION SYND BLD/T FISH: NORMAL
PLATELET # BLD AUTO: 350 X10E3/UL (ref 150–450)
POTASSIUM SERPL-SCNC: 4.8 MMOL/L (ref 3.5–5.2)
PROT SERPL-MCNC: 7.1 G/DL (ref 6–8.5)
RBC # BLD AUTO: 3.88 X10E6/UL (ref 3.77–5.28)
SL AMB VLDL CHOLESTEROL CALC: 25 MG/DL (ref 5–40)
SODIUM SERPL-SCNC: 141 MMOL/L (ref 134–144)
T4 FREE SERPL-MCNC: 1.07 NG/DL (ref 0.82–1.77)
TRIGL SERPL-MCNC: 143 MG/DL (ref 0–149)
TSH SERPL DL<=0.005 MIU/L-ACNC: 2.83 UIU/ML (ref 0.45–4.5)
WBC # BLD AUTO: 6.5 X10E3/UL (ref 3.4–10.8)

## 2025-04-28 ENCOUNTER — OFFICE VISIT (OUTPATIENT)
Dept: FAMILY MEDICINE CLINIC | Facility: CLINIC | Age: 65
End: 2025-04-28
Payer: COMMERCIAL

## 2025-04-28 VITALS
HEART RATE: 89 BPM | HEIGHT: 65 IN | DIASTOLIC BLOOD PRESSURE: 80 MMHG | TEMPERATURE: 97.9 F | RESPIRATION RATE: 16 BRPM | SYSTOLIC BLOOD PRESSURE: 120 MMHG | BODY MASS INDEX: 27.32 KG/M2 | WEIGHT: 164 LBS

## 2025-04-28 DIAGNOSIS — E78.5 DYSLIPIDEMIA: ICD-10-CM

## 2025-04-28 DIAGNOSIS — I10 BENIGN ESSENTIAL HYPERTENSION: Primary | ICD-10-CM

## 2025-04-28 DIAGNOSIS — E06.3 HASHIMOTO'S THYROIDITIS: ICD-10-CM

## 2025-04-28 DIAGNOSIS — R73.03 PREDIABETES: ICD-10-CM

## 2025-04-28 PROBLEM — D17.1 LIPOMA OF ABDOMINAL WALL: Status: RESOLVED | Noted: 2022-07-08 | Resolved: 2025-04-28

## 2025-04-28 PROCEDURE — 99214 OFFICE O/P EST MOD 30 MIN: CPT | Performed by: FAMILY MEDICINE

## 2025-04-28 RX ORDER — TIMOLOL MALEATE 2.5 MG/ML
SOLUTION/ DROPS OPHTHALMIC
COMMUNITY
Start: 2025-02-23

## 2025-04-28 RX ORDER — SODIUM, POTASSIUM,MAG SULFATES 17.5-3.13G
SOLUTION, RECONSTITUTED, ORAL ORAL
COMMUNITY
Start: 2025-03-14

## 2025-04-28 NOTE — ASSESSMENT & PLAN NOTE
Hemoglobin A1c is down slightly to 6.2  Hemoglobin A1C   Date Value Ref Range Status   04/11/2025 6.2 (H) 4.8 - 5.6 % Final     Comment:              Prediabetes: 5.7 - 6.4           Diabetes: >6.4           Glycemic control for adults with diabetes: <7.0        Orders:  •  Hemoglobin A1C; Future

## 2025-04-28 NOTE — ASSESSMENT & PLAN NOTE
Well controlled  Continue cozaar 50 mg a day   Orders:  •  CBC; Future  •  Comprehensive metabolic panel; Future  •  Lipid Panel with Direct LDL reflex; Future

## 2025-04-28 NOTE — ASSESSMENT & PLAN NOTE
Stable  Continue atorvastatin 20 mg a day   Orders:  •  Comprehensive metabolic panel; Future  •  Lipid Panel with Direct LDL reflex; Future

## 2025-04-28 NOTE — PROGRESS NOTES
"Name: Rufina Badillo      : 1960      MRN: [id number]  Encounter Provider: Adeola Valverde DO  Encounter Date: 2025   Encounter department: [facility]  :  Assessment & Plan  Benign essential hypertension  Well controlled  Continue cozaar 50 mg a day   Orders:  •  CBC; Future  •  Comprehensive metabolic panel; Future  •  Lipid Panel with Direct LDL reflex; Future    Prediabetes  Hemoglobin A1c is down slightly to 6.2  Hemoglobin A1C   Date Value Ref Range Status   2025 6.2 (H) 4.8 - 5.6 % Final     Comment:              Prediabetes: 5.7 - 6.4           Diabetes: >6.4           Glycemic control for adults with diabetes: <7.0        Orders:  •  Hemoglobin A1C; Future    Hashimoto's thyroiditis  Stable   Orders:  •  TSH, 3rd generation; Future  •  T4, free; Future    Dyslipidemia  Stable  Continue atorvastatin 20 mg a day   Orders:  •  Comprehensive metabolic panel; Future  •  Lipid Panel with Direct LDL reflex; Future      Assessment & Plan      Return in about 6 months (around 10/28/2025) for Annual physical.     History of Present Illness   She went to [place] yesterday with her family.  She had half a sub for dinner and then had popcorn. She then broke out it in hives. She took benadryl to help the rash.   She is feeling better today.       Review of Systems    Objective   /80   Pulse 89   Temp 97.9 °F (36.6 °C)   Resp 16   Ht 5' 5\" (1.651 m)   Wt 74.4 kg (164 lb)   BMI 27.29 kg/m²      Physical Exam  Vitals and nursing note reviewed.   Constitutional:       General: She is not in acute distress.     Appearance: She is well-developed.   HENT:      Head: Normocephalic and atraumatic.      Right Ear: Tympanic membrane normal.      Left Ear: Tympanic membrane normal.   Cardiovascular:      Rate and Rhythm: Normal rate and regular rhythm.      Heart sounds: No murmur heard.  Pulmonary:      Effort: Pulmonary effort is normal. No respiratory distress.      Breath sounds: " Normal breath sounds.   Musculoskeletal:      Cervical back: Neck supple.   Neurological:      Mental Status: She is alert.

## 2025-05-15 DIAGNOSIS — K21.9 GERD WITHOUT ESOPHAGITIS: ICD-10-CM

## 2025-05-15 DIAGNOSIS — E78.5 DYSLIPIDEMIA: ICD-10-CM

## 2025-05-15 DIAGNOSIS — I10 BENIGN ESSENTIAL HYPERTENSION: ICD-10-CM

## 2025-05-15 RX ORDER — ATORVASTATIN CALCIUM 20 MG/1
20 TABLET, FILM COATED ORAL DAILY
Qty: 90 TABLET | Refills: 1 | Status: SHIPPED | OUTPATIENT
Start: 2025-05-15

## 2025-05-15 RX ORDER — LOSARTAN POTASSIUM 50 MG
50 TABLET ORAL DAILY
Qty: 90 TABLET | Refills: 1 | Status: SHIPPED | OUTPATIENT
Start: 2025-05-15

## 2025-05-15 RX ORDER — OMEPRAZOLE 40 MG/1
40 CAPSULE, DELAYED RELEASE ORAL DAILY
Qty: 90 CAPSULE | Refills: 1 | Status: SHIPPED | OUTPATIENT
Start: 2025-05-15

## 2025-05-20 ENCOUNTER — HOSPITAL ENCOUNTER (OUTPATIENT)
Dept: RADIOLOGY | Facility: HOSPITAL | Age: 65
Discharge: HOME | End: 2025-05-20
Payer: COMMERCIAL

## 2025-05-20 DIAGNOSIS — Z12.31 SCREENING MAMMOGRAM, ENCOUNTER FOR: ICD-10-CM

## 2025-05-20 PROCEDURE — 77067 SCR MAMMO BI INCL CAD: CPT

## 2025-05-20 PROCEDURE — 77063 BREAST TOMOSYNTHESIS BI: CPT

## 2025-05-22 ENCOUNTER — RESULTS FOLLOW-UP (OUTPATIENT)
Dept: FAMILY MEDICINE CLINIC | Facility: CLINIC | Age: 65
End: 2025-05-22

## 2025-06-13 ENCOUNTER — TELEPHONE (OUTPATIENT)
Age: 65
End: 2025-06-13

## 2025-06-13 NOTE — TELEPHONE ENCOUNTER
Caller: Rufina Badillo    Doctor: Dr. Loza    Reason for call: Rufina wants to postpone her surgery to the week of Sept 14.  Is this possible?  Can someone please reach out to her?  Thank you.     Call back#: [phone]